# Patient Record
Sex: MALE | Race: BLACK OR AFRICAN AMERICAN
[De-identification: names, ages, dates, MRNs, and addresses within clinical notes are randomized per-mention and may not be internally consistent; named-entity substitution may affect disease eponyms.]

---

## 2018-08-23 ENCOUNTER — HOSPITAL ENCOUNTER (INPATIENT)
Dept: HOSPITAL 5 - ED | Age: 78
LOS: 5 days | Discharge: ANOTHER HOSPITAL | DRG: 287 | End: 2018-08-28
Attending: INTERNAL MEDICINE | Admitting: INTERNAL MEDICINE
Payer: MEDICARE

## 2018-08-23 DIAGNOSIS — I69.354: ICD-10-CM

## 2018-08-23 DIAGNOSIS — I25.10: Primary | ICD-10-CM

## 2018-08-23 DIAGNOSIS — K21.9: ICD-10-CM

## 2018-08-23 DIAGNOSIS — J45.909: ICD-10-CM

## 2018-08-23 DIAGNOSIS — Z88.5: ICD-10-CM

## 2018-08-23 DIAGNOSIS — E10.9: ICD-10-CM

## 2018-08-23 DIAGNOSIS — Z88.0: ICD-10-CM

## 2018-08-23 DIAGNOSIS — Z88.6: ICD-10-CM

## 2018-08-23 DIAGNOSIS — Z96.659: ICD-10-CM

## 2018-08-23 DIAGNOSIS — I10: ICD-10-CM

## 2018-08-23 DIAGNOSIS — E89.0: ICD-10-CM

## 2018-08-23 DIAGNOSIS — Z79.899: ICD-10-CM

## 2018-08-23 DIAGNOSIS — G47.00: ICD-10-CM

## 2018-08-23 LAB
BASOPHILS # (AUTO): 0.1 K/MM3 (ref 0–0.1)
BASOPHILS NFR BLD AUTO: 0.9 % (ref 0–1.8)
BUN SERPL-MCNC: 10 MG/DL (ref 9–20)
BUN/CREAT SERPL: 14 %
CALCIUM SERPL-MCNC: 9 MG/DL (ref 8.4–10.2)
EOSINOPHIL # BLD AUTO: 0.2 K/MM3 (ref 0–0.4)
EOSINOPHIL NFR BLD AUTO: 3 % (ref 0–4.3)
HCT VFR BLD CALC: 45.6 % (ref 35.5–45.6)
HEMOLYSIS INDEX: 17
HGB BLD-MCNC: 15.3 GM/DL (ref 11.8–15.2)
LYMPHOCYTES # BLD AUTO: 2.7 K/MM3 (ref 1.2–5.4)
LYMPHOCYTES NFR BLD AUTO: 39 % (ref 13.4–35)
MCH RBC QN AUTO: 29 PG (ref 28–32)
MCHC RBC AUTO-ENTMCNC: 34 % (ref 32–34)
MCV RBC AUTO: 86 FL (ref 84–94)
MONOCYTES # (AUTO): 0.6 K/MM3 (ref 0–0.8)
MONOCYTES % (AUTO): 8.7 % (ref 0–7.3)
PLATELET # BLD: 335 K/MM3 (ref 140–440)
RBC # BLD AUTO: 5.3 M/MM3 (ref 3.65–5.03)

## 2018-08-23 PROCEDURE — 85610 PROTHROMBIN TIME: CPT

## 2018-08-23 PROCEDURE — 78582 LUNG VENTILAT&PERFUS IMAGING: CPT

## 2018-08-23 PROCEDURE — 93005 ELECTROCARDIOGRAM TRACING: CPT

## 2018-08-23 PROCEDURE — 93458 L HRT ARTERY/VENTRICLE ANGIO: CPT

## 2018-08-23 PROCEDURE — 96374 THER/PROPH/DIAG INJ IV PUSH: CPT

## 2018-08-23 PROCEDURE — A9558 XE133 XENON 10MCI: HCPCS

## 2018-08-23 PROCEDURE — A9540 TC99M MAA: HCPCS

## 2018-08-23 PROCEDURE — 83880 ASSAY OF NATRIURETIC PEPTIDE: CPT

## 2018-08-23 PROCEDURE — 85379 FIBRIN DEGRADATION QUANT: CPT

## 2018-08-23 PROCEDURE — 82962 GLUCOSE BLOOD TEST: CPT

## 2018-08-23 PROCEDURE — 85025 COMPLETE CBC W/AUTO DIFF WBC: CPT

## 2018-08-23 PROCEDURE — A9502 TC99M TETROFOSMIN: HCPCS

## 2018-08-23 PROCEDURE — 83690 ASSAY OF LIPASE: CPT

## 2018-08-23 PROCEDURE — 96375 TX/PRO/DX INJ NEW DRUG ADDON: CPT

## 2018-08-23 PROCEDURE — 80048 BASIC METABOLIC PNL TOTAL CA: CPT

## 2018-08-23 PROCEDURE — 78452 HT MUSCLE IMAGE SPECT MULT: CPT

## 2018-08-23 PROCEDURE — 85730 THROMBOPLASTIN TIME PARTIAL: CPT

## 2018-08-23 PROCEDURE — 36415 COLL VENOUS BLD VENIPUNCTURE: CPT

## 2018-08-23 PROCEDURE — 93010 ELECTROCARDIOGRAM REPORT: CPT

## 2018-08-23 PROCEDURE — 93017 CV STRESS TEST TRACING ONLY: CPT

## 2018-08-23 PROCEDURE — 86900 BLOOD TYPING SEROLOGIC ABO: CPT

## 2018-08-23 PROCEDURE — 84484 ASSAY OF TROPONIN QUANT: CPT

## 2018-08-23 PROCEDURE — 82550 ASSAY OF CK (CPK): CPT

## 2018-08-23 PROCEDURE — 86901 BLOOD TYPING SEROLOGIC RH(D): CPT

## 2018-08-23 PROCEDURE — C1894 INTRO/SHEATH, NON-LASER: HCPCS

## 2018-08-23 PROCEDURE — 86850 RBC ANTIBODY SCREEN: CPT

## 2018-08-23 PROCEDURE — 71045 X-RAY EXAM CHEST 1 VIEW: CPT

## 2018-08-23 PROCEDURE — 82553 CREATINE MB FRACTION: CPT

## 2018-08-23 RX ADMIN — METOPROLOL TARTRATE SCH MG: 25 TABLET, FILM COATED ORAL at 22:26

## 2018-08-23 RX ADMIN — TEMAZEPAM PRN MG: 15 CAPSULE ORAL at 22:29

## 2018-08-23 NOTE — XRAY REPORT
AP CHEST:



HISTORY: chest pain



AP view of the chest demonstrates a normal mediastinal and

cardiac contour with clear lungs and normal bony and soft tissue

structures.



IMPRESSION:

Unremarkable AP chest. No change since 2/8/18.

## 2018-08-23 NOTE — EMERGENCY DEPARTMENT REPORT
ED Chest Pain HPI





- General


Chief Complaint: Chest Pain


Stated Complaint: CHEST PAIN


Time Seen by Provider: 08/23/18 09:22


Source: patient, EMS


Mode of arrival: Stretcher


Limitations: Physical Limitation





- History of Present Illness


Initial Comments: 





Patient is 77 years old male with history of hypertension, diabetes and 2 a 

stroke.  Patient presented to the ER complaining of left sided chest pain for 

the last 2 days.  Patient describes pain as pressure radiating to the left 

upper extremity.  Patient denied any shortness of breath, nausea or vomiting.  

No weakness numbness or tingling sensation.  Patient denied any history of 

coronary artery disease before.


MD Complaint: chest pain


-: days(s)


Onset: during rest


Pain Location: left chest


Pain Radiation: LUE


Severity scale (0 -10): 4


Quality: pressure


Consistency: intermittent





- Related Data


 Previous Rx's











 Medication  Instructions  Recorded  Last Taken  Type


 


Metoclopramide [Reglan TAB] 10 mg PO Q6H PRN  tablet 01/09/18 Unknown Rx


 


Pitavastatin Calcium [LiVALO] 2 mg PO DAILY #30 tablet 01/09/18 Unknown Rx


 


Acetaminophen [Acetaminophen TAB] 650 mg PO Q4H PRN  tablet 02/10/18 Unknown Rx


 


Bisacodyl [Dulcolax suppos] 10 mg IN QDAY PRN  supp.rect 02/10/18 Unknown Rx


 


Magnesium Hydroxide [Milk of 30 ml PO Q4H PRN  oral.liqd 02/10/18 Unknown Rx





Magnesia]    


 


Metoprolol [Lopressor TAB] 37.5 mg PO BID #60 tablet 02/10/18 Unknown Rx


 


Pantoprazole [Protonix TAB] 40 mg PO DAILY #30 tablet 02/10/18 Unknown Rx


 


Tamsulosin [Flomax] 0.4 mg PO QDAY #30 capsule 02/10/18 Unknown Rx


 


Aspirin EC [Aspirin Enteric Coated 81 mg PO QDAY #30 tablet.dr 04/23/18 Unknown 

Rx





TAB]    


 


Insulin NPH/Regular [NovoLIN 70/30] 10 unit SUB-Q BIDDIAB #7 units 04/23/18 

Unknown Rx


 


Levothyroxine [Synthroid] 75 mcg PO DAILY@0600 #30 tablet 04/23/18 Unknown Rx


 


Levothyroxine [Synthroid] 100 mcg PO DAILY@0600 #30 tablet 04/23/18 Unknown Rx


 


Temazepam [Restoril] 15 mg PO QHS PRN #30 capsule 04/23/18 Unknown Rx


 


levoFLOXacin [Levaquin TAB] 500 mg PO Q24HR #5 tablet 04/23/18 Unknown Rx


 


metroNIDAZOLE [Flagyl TAB] 500 mg PO Q8HR #15 tablet 04/23/18 Unknown Rx











 Allergies











Allergy/AdvReac Type Severity Reaction Status Date / Time


 


codeine Allergy  Itching Verified 01/03/18 14:14


 


penicillin Allergy  Rash Verified 01/03/18 14:14


 


aspirin AdvReac  Unknown Verified 01/03/18 14:14














Heart Score





- HEART Score


History: Moderately suspicious


EKG: Non-specific


Age: > 65


Risk factors: > 3 risk factors or hx of atherosclerotic disease


Troponin: < normal limit


HEART Score: 6





- Critical Actions


Critical Actions: 4-6 pts:12-16.6% risk of adverse cardiac event. Should be 

admitted





ED Review of Systems


ROS: 


Stated complaint: CHEST PAIN


Other details as noted in HPI





Comment: All other systems reviewed and negative


Constitutional: denies: chills, fever


Respiratory: denies: cough, orthopnea, shortness of breath, SOB with exertion, 

SOB at rest


Cardiovascular: chest pain.  denies: palpitations, dyspnea on exertion


Gastrointestinal: denies: abdominal pain, nausea, vomiting, diarrhea, 

constipation, hematemesis, melena, hematochezia


Neurological: denies: headache, weakness, numbness, paresthesias, confusion, 

abnormal gait





ED Past Medical Hx





- Past Medical History


Hx Hypertension: Yes


Hx CVA: Yes (L sided deficits)


Hx Diabetes: Yes


Hx Asthma: Yes


Hx HIV: No


Additional medical history: Graves dx.  gastric ulcers





- Surgical History


Additional Surgical History: knee OR bilat.  thyroid OR





- Social History


Smoking Status: Former Smoker


Substance Use Type: None





- Medications


Home Medications: 


 Home Medications











 Medication  Instructions  Recorded  Confirmed  Last Taken  Type


 


Metoclopramide [Reglan TAB] 10 mg PO Q6H PRN  tablet 01/09/18 04/21/18 Unknown 

Rx


 


Pitavastatin Calcium [LiVALO] 2 mg PO DAILY #30 tablet 01/09/18 04/21/18 

Unknown Rx


 


Acetaminophen [Acetaminophen TAB] 650 mg PO Q4H PRN  tablet 02/10/18 04/21/18 

Unknown Rx


 


Bisacodyl [Dulcolax suppos] 10 mg IN QDAY PRN  supp.rect 02/10/18 04/21/18 

Unknown Rx


 


Magnesium Hydroxide [Milk of 30 ml PO Q4H PRN  oral.liqd 02/10/18 04/21/18 

Unknown Rx





Magnesia]     


 


Metoprolol [Lopressor TAB] 37.5 mg PO BID #60 tablet 02/10/18 04/21/18 Unknown 

Rx


 


Pantoprazole [Protonix TAB] 40 mg PO DAILY #30 tablet 02/10/18 04/21/18 Unknown 

Rx


 


Tamsulosin [Flomax] 0.4 mg PO QDAY #30 capsule 02/10/18 04/21/18 Unknown Rx


 


Aspirin EC [Aspirin Enteric Coated 81 mg PO QDAY #30 tablet.dr 04/23/18  

Unknown Rx





TAB]     


 


Insulin NPH/Regular [NovoLIN 70/30] 10 unit SUB-Q BIDDIAB #7 units 04/23/18 04/ 21/18 Unknown Rx


 


Levothyroxine [Synthroid] 75 mcg PO DAILY@0600 #30 tablet 04/23/18  Unknown Rx


 


Levothyroxine [Synthroid] 100 mcg PO DAILY@0600 #30 tablet 04/23/18  Unknown Rx


 


Temazepam [Restoril] 15 mg PO QHS PRN #30 capsule 04/23/18  Unknown Rx


 


levoFLOXacin [Levaquin TAB] 500 mg PO Q24HR #5 tablet 04/23/18  Unknown Rx


 


metroNIDAZOLE [Flagyl TAB] 500 mg PO Q8HR #15 tablet 04/23/18  Unknown Rx














ED Physical Exam





- General


Limitations: No Limitations, Physical Limitation


General appearance: alert, in no apparent distress





- Head


Head exam: Present: atraumatic, normocephalic, normal inspection





- Eye


Eye exam: Present: normal appearance, PERRL





- ENT


ENT exam: Present: normal exam, normal orophraynx, mucous membranes moist





- Neck


Neck exam: Present: normal inspection, full ROM.  Absent: tenderness, 

meningismus, lymphadenopathy, thyromegaly





- Respiratory


Respiratory exam: Present: normal lung sounds bilaterally.  Absent: respiratory 

distress, wheezes, rales, rhonchi, stridor, chest wall tenderness, accessory 

muscle use, decreased breath sounds, prolonged expiratory





- Cardiovascular


Cardiovascular Exam: Present: regular rate, normal rhythm, normal heart sounds





- GI/Abdominal


GI/Abdominal exam: Present: soft, normal bowel sounds.  Absent: distended, 

tenderness, guarding, rebound, rigid, organomegaly, mass, bruit, pulsatile mass

, hernia





- Extremities Exam


Extremities exam: Present: normal inspection, full ROM, normal capillary 

refill.  Absent: tenderness, pedal edema, joint swelling, calf tenderness





- Back Exam


Back exam: Present: normal inspection, full ROM.  Absent: tenderness, CVA 

tenderness (R), CVA tenderness (L), muscle spasm, paraspinal tenderness, 

vertebral tenderness, rash noted





- Neurological Exam


Neurological exam: Present: alert, oriented X3, CN II-XII intact, normal gait, 

reflexes normal





- Skin


Skin exam: Present: warm, intact, normal color





ED Course


 Vital Signs











  08/23/18





  09:13


 


Temperature 98.7 F


 


Pulse Rate 65


 


Respiratory 14





Rate 


 


Blood Pressure 156/73


 


Blood Pressure 156/73





[Right] 


 


O2 Sat by Pulse 94





Oximetry 














- Reevaluation(s)


Reevaluation #1: 





08/23/18 11:17


* Patient family and from the nurse and that patient is having swelling.  

Patient examined by me there is no evidence of swelling and patient denied any 

difficulty breathing or swallowing.  Patient is on lisinopril for years advised 

to stop the lisinopril to give the patient Solu-Medrol 125 and Benadryl 25 mg 

and Pepcid 20 mg.  Patient will be admitted to telemetry





ED Medical Decision Making





- Lab Data


Result diagrams: 


 08/23/18 09:28





 08/23/18 09:28





- EKG Data


-: EKG Interpreted by Me


EKG shows normal: sinus rhythm


Rate: normal





- EKG Data


Interpretation: no acute changes





- Radiology Data


Radiology results: report reviewed





- Medical Decision Making





I discussed the patient is Dr. Valera, he agreed to admit the patient to 

medical service.


Critical care attestation.: 


If time is entered above; I have spent that time in minutes in the direct care 

of this critically ill patient, excluding procedure time.








ED Disposition


Clinical Impression: 


 Chest pain





Disposition: DC-09 OP ADMIT IP TO THIS HOSP


Is pt being admited?: Yes


Condition: Stable


Instructions:  Chest Pain (ED)


Referrals: 


PRIMARY CARE,MD [Primary Care Provider] - 3-5 Days

## 2018-08-23 NOTE — HISTORY AND PHYSICAL REPORT
History of Present Illness


Date of examination: 08/23/18


Date of admission: 





08/23/18


Chief complaint: 





Chest pain





History of present illness: 





Patient is 77 years old male with history of hypertension, diabetes and 2 a 

stroke.  Patient presented to the ER complaining of left sided chest pain for 

the last 2 days.  Patient describes pain as pressure radiating to the left 

upper extremity.  Patient denied any shortness of breath, nausea or vomiting.  

No weakness numbness or tingling sensation.  Patient denied any history of 

coronary artery disease before.





Past History


Past Medical History: diabetes, GERD, hypertension


Past Surgical History: thyroidectomy, total knee replacement


Social history: single


Family history: no significant family history (reviewed)











Medications and Allergies


 Allergies











Allergy/AdvReac Type Severity Reaction Status Date / Time


 


codeine Allergy  Itching Verified 01/03/18 14:14


 


penicillin Allergy  Rash Verified 01/03/18 14:14


 


aspirin AdvReac  Unknown Verified 01/03/18 14:14











 Home Medications











 Medication  Instructions  Recorded  Confirmed  Last Taken  Type


 


Metoprolol [Lopressor TAB] 37.5 mg PO BID #60 tablet 02/10/18 08/23/18 08/23/18 

Rx


 


Ezetimibe [Zetia] 10 mg PO QDAY 08/23/18 08/23/18 Unknown History


 


Levothyroxine Sodium [Synthroid] 175 mcg PO QDAY 08/23/18 08/23/18 Unknown 

History


 


Lisinopril [Zestril] 40 mg PO QDAY 08/23/18 08/23/18 08/23/18 History














Exam





- Physical Exam


Narrative exam: 





Hospitalist Physical exam:


GENERAL:  well-developed and well-nourished male  lying on bed appeared to be 

in no discomfort. 


HEENT: Normocephalic.  Atraumatic.  No conjunctival congestion or icterus. 

Patient has moist mucous membranes.


NECK: Supple.  Trachea midline.


CHEST/LUNGS: Clear to auscultated bilaterally, breathing nonlabored. No wheezes 

crackles or rhonchi.


HEART/CARDIOVASCULAR: Regular in rate and rhythm.  S1 and S2 positive.


ABDOMEN: Abdomen is soft, LLQ tender.  Patient has normal bowel sounds.  


SKIN: There is no rash.  Warm and dry.


NEURO:  leftsided weakness.  Follows command.


MUSCULOSKELETAL: No joint effusion or tenderness.


EXTRIMITY: No edema, no cyanosis or clubbing.


PSYCH:  Cooperative.











- Constitutional


Vitals: 


 











Temp Pulse Resp BP Pulse Ox


 


 98.7 F   65   14   156/73   96 


 


 08/23/18 09:13  08/23/18 09:13  08/23/18 09:13  08/23/18 09:13  08/23/18 09:13














Results





- Labs


CBC & Chem 7: 


 08/23/18 09:28





 08/23/18 09:28


Labs: 


 Abnormal lab results











  08/23/18 08/23/18 08/23/18 Range/Units





  09:28 09:28 09:28 


 


RBC  5.30 H    (3.65-5.03)  M/mm3


 


Hgb  15.3 H    (11.8-15.2)  gm/dl


 


Lymph % (Auto)  39.0 H    (13.4-35.0)  %


 


Mono % (Auto)  8.7 H    (0.0-7.3)  %


 


Sodium   136 L   (137-145)  mmol/L


 


Chloride   97.9 L   ()  mmol/L


 


Creatinine   0.7 L   (0.8-1.5)  mg/dL


 


Glucose   133 H   ()  mg/dL


 


Lipase    85 H  (13-60)  units/L














Assessment and Plan





/Chest pain, unstable angina?


- admit tele, monitor serial CE, EKG


- 2d echo on 02/18 showed preserved EF


- plan for stress test tomorrow


- cont asp, statin and BB





/Hypertension, stable


Monitored BP q shift, ordered IV hydralazine prn for SBP >160


resumed home meds





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


- Cont aspirin and statin





/Insomnia, on restoril





/hypothyroidism, on synthroid





/ DVT prophylaxis


lovenox





Radiological test: 


CXR - no infiltrates

## 2018-08-24 RX ADMIN — METOPROLOL TARTRATE SCH MG: 25 TABLET, FILM COATED ORAL at 09:13

## 2018-08-24 RX ADMIN — TAMSULOSIN HYDROCHLORIDE SCH MG: 0.4 CAPSULE ORAL at 09:14

## 2018-08-24 RX ADMIN — METOPROLOL TARTRATE SCH MG: 25 TABLET, FILM COATED ORAL at 22:58

## 2018-08-24 RX ADMIN — TEMAZEPAM PRN MG: 15 CAPSULE ORAL at 23:47

## 2018-08-24 RX ADMIN — PRAVASTATIN SODIUM SCH MG: 40 TABLET ORAL at 22:59

## 2018-08-24 RX ADMIN — ASPIRIN SCH MG: 81 TABLET, COATED ORAL at 09:14

## 2018-08-24 RX ADMIN — PANTOPRAZOLE SODIUM SCH MG: 40 TABLET, DELAYED RELEASE ORAL at 09:14

## 2018-08-24 RX ADMIN — OXYCODONE AND ACETAMINOPHEN PRN TAB: 5; 325 TABLET ORAL at 15:16

## 2018-08-24 RX ADMIN — LEVOTHYROXINE SODIUM SCH: 0.1 TABLET ORAL at 05:48

## 2018-08-24 NOTE — PROGRESS NOTE
Assessment and Plan





/Chest pain, unstable angina?


- monitor at tele, monitor serial CE, EKG


- 2d echo on 02/18 showed preserved EF


- VQ scan -ve for acute PE


- plan for stress test on Sunday


- cont asp, statin and BB





/Hypertension, stable


Monitored BP q shift, ordered IV hydralazine prn for SBP >160


resumed home meds





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


- Cont aspirin and statin





/Insomnia, on restoril





/hypothyroidism, on synthroid





/ DVT prophylaxis


lovenox





Radiological test: 


CXR - no infiltrates





VQ scan: Limited ventilation imaging with normal perfusion images. No pulmonary 

embolus suspected. 











Subjective


Date of service: 08/24/18


Interval history: 





Pt seen and examined


C/o intermittent chest pain





Objective





- Exam


Narrative Exam: 





Hospitalist Physical exam:


GENERAL:  well-developed and well-nourished male  lying on bed appeared to be 

in no discomfort. 


HEENT: Normocephalic.  Atraumatic.  No conjunctival congestion or icterus. 

Patient has moist mucous membranes.


NECK: Supple.  Trachea midline.


CHEST/LUNGS: Clear to auscultated bilaterally, breathing nonlabored. No wheezes 

crackles or rhonchi.


HEART/CARDIOVASCULAR: Regular in rate and rhythm.  S1 and S2 positive.


ABDOMEN: Abdomen is soft, LLQ tender.  Patient has normal bowel sounds.  


SKIN: There is no rash.  Warm and dry.


NEURO:  leftsided weakness.  Follows command.


MUSCULOSKELETAL: No joint effusion or tenderness.


EXTRIMITY: No edema, no cyanosis or clubbing.


PSYCH:  Cooperative.











- Constitutional


Vitals: 


 Vital Signs - 12hr











  08/24/18 08/24/18 08/24/18





  05:35 08:13 09:13


 


Temperature 97.5 F L  


 


Pulse Rate 72 80 80


 


Pulse Rate [   





From Monitor]   


 


Respiratory 20  





Rate   


 


Respiratory   





Rate [Hip]   


 


Blood Pressure   140/74


 


Blood Pressure 143/78  





[Right]   


 


O2 Sat by Pulse 96  





Oximetry   














  08/24/18





  10:00


 


Temperature 


 


Pulse Rate 


 


Pulse Rate [ 90





From Monitor] 


 


Respiratory 20





Rate 


 


Respiratory 20





Rate [Hip] 


 


Blood Pressure 


 


Blood Pressure 





[Right] 


 


O2 Sat by Pulse 





Oximetry 














- Labs


CBC & Chem 7: 


 08/23/18 09:28





 08/23/18 09:28


Labs: 


 Abnormal lab results











  08/23/18 08/23/18 08/24/18 Range/Units





  20:58 20:58 12:11 


 


D-Dimer  379.9 H    (0-234)  ng/mlDDU


 


POC Glucose    167 H  ()  


 


Total Creatine Kinase   36 L   ()  units/L

## 2018-08-24 NOTE — NUCLEAR MEDICINE REPORT
Ventilation/perfusion lung scan:



Elevated d-dimer/chest pain.



The ventilation imaging is somewhat limited. There is a relatively 

unremarkable distribution of radionuclide on the initial images. 

Equilibrium images however are inadequate and there is no obvious 

retention on the delayed images.



The perfusion images with multiple projections demonstrates a 

homogeneous and unremarkable distribution of radioactivity in both 

lungs.



Impressions:



Limited ventilation imaging with normal perfusion images. No pulmonary 

embolus suspected.

## 2018-08-25 RX ADMIN — HEPARIN SODIUM SCH UNIT: 5000 INJECTION, SOLUTION INTRAVENOUS; SUBCUTANEOUS at 21:37

## 2018-08-25 RX ADMIN — LEVOTHYROXINE SODIUM SCH MCG: 0.1 TABLET ORAL at 06:55

## 2018-08-25 RX ADMIN — PRAVASTATIN SODIUM SCH MG: 40 TABLET ORAL at 21:34

## 2018-08-25 RX ADMIN — METOPROLOL TARTRATE SCH MG: 25 TABLET, FILM COATED ORAL at 10:04

## 2018-08-25 RX ADMIN — METOPROLOL TARTRATE SCH MG: 25 TABLET, FILM COATED ORAL at 21:35

## 2018-08-25 RX ADMIN — TEMAZEPAM PRN MG: 15 CAPSULE ORAL at 21:34

## 2018-08-25 RX ADMIN — TAMSULOSIN HYDROCHLORIDE SCH MG: 0.4 CAPSULE ORAL at 10:03

## 2018-08-25 RX ADMIN — ASPIRIN SCH MG: 81 TABLET, COATED ORAL at 10:03

## 2018-08-25 RX ADMIN — PANTOPRAZOLE SODIUM SCH MG: 40 TABLET, DELAYED RELEASE ORAL at 10:02

## 2018-08-25 NOTE — PROGRESS NOTE
Assessment and Plan


Assessment and plan: 





77-year-old male with past medical history significant for hypertension, 

hypothyroidism, diabetes mellitus presented to the emergency department with 

complaints of chest pain.  In the ED VQ scan was done and no probability for PE.





Chest pain


- Cardiac enzymes were negative


- Couldn't do this today because of the VQ scan, will have stress test tomorrow


Diabetes mellitus


- Continue insulin


Hypertension


- Continue antihypertensives


Hypothyroidism


- Continue levothyroxine


DVT prophylaxis


- On heparin


Disposition


- Possibly tomorrow if stress test is negative.





History


Interval history: 





Patient was seen and evaluated this morning, patient said he didn't have any 

chest pain today.





Hospitalist Physical





- Physical exam


Narrative exam: 





 Not in cardiopulmonary distress. 


 The patient appeared well nourished and normally developed.


 Vital signs as documented.


 Head exam is unremarkable.


 No scleral icterus .


 Neck is without jugular venous distension, thyromegaly, or carotid bruits. 


 Lungs are clear to auscultation.


Cardiac exam reveals regular rate and  Rhythm. First and second heart sounds 

normal. No murmurs, rubs or gallops. 


Abdominal exam reveals normal bowel sounds, no masses, no organomegaly and no 

aortic enlargement. 


Extremities are nonedematous and both femoral and pedal pulses are normal.


CNS: Alert and oriented 3.  No focal weakness.





- Constitutional


Vitals: 


 











Temp Pulse Resp BP Pulse Ox


 


 98.1 F   78   20   164/80   96 


 


 08/25/18 05:01  08/25/18 10:04  08/25/18 10:00  08/25/18 10:04  08/25/18 10:00














Results





- Labs


CBC & Chem 7: 


 08/23/18 09:28





 08/23/18 09:28


Labs: 


 Laboratory Last Values











WBC  7.0 K/mm3 (4.5-11.0)   08/23/18  09:28    


 


RBC  5.30 M/mm3 (3.65-5.03)  H  08/23/18  09:28    


 


Hgb  15.3 gm/dl (11.8-15.2)  H  08/23/18  09:28    


 


Hct  45.6 % (35.5-45.6)   08/23/18  09:28    


 


MCV  86 fl (84-94)   08/23/18  09:28    


 


MCH  29 pg (28-32)   08/23/18  09:28    


 


MCHC  34 % (32-34)   08/23/18  09:28    


 


RDW  14.2 % (13.2-15.2)   08/23/18  09:28    


 


Plt Count  335 K/mm3 (140-440)   08/23/18  09:28    


 


Lymph % (Auto)  39.0 % (13.4-35.0)  H  08/23/18  09:28    


 


Mono % (Auto)  8.7 % (0.0-7.3)  H  08/23/18  09:28    


 


Eos % (Auto)  3.0 % (0.0-4.3)   08/23/18  09:28    


 


Baso % (Auto)  0.9 % (0.0-1.8)   08/23/18  09:28    


 


Lymph #  2.7 K/mm3 (1.2-5.4)   08/23/18  09:28    


 


Mono #  0.6 K/mm3 (0.0-0.8)   08/23/18  09:28    


 


Eos #  0.2 K/mm3 (0.0-0.4)   08/23/18  09:28    


 


Baso #  0.1 K/mm3 (0.0-0.1)   08/23/18  09:28    


 


Seg Neutrophils %  48.4 % (40.0-70.0)   08/23/18  09:28    


 


Seg Neutrophils #  3.4 K/mm3 (1.8-7.7)   08/23/18  09:28    


 


D-Dimer  379.9 ng/mlDDU (0-234)  H  08/23/18  20:58    


 


Sodium  136 mmol/L (137-145)  L  08/23/18  09:28    


 


Potassium  4.1 mmol/L (3.6-5.0)   08/23/18  09:28    


 


Chloride  97.9 mmol/L ()  L  08/23/18  09:28    


 


Carbon Dioxide  26 mmol/L (22-30)   08/23/18  09:28    


 


Anion Gap  16 mmol/L  08/23/18  09:28    


 


BUN  10 mg/dL (9-20)   08/23/18  09:28    


 


Creatinine  0.7 mg/dL (0.8-1.5)  L  08/23/18  09:28    


 


Estimated GFR  > 60 ml/min  08/23/18  09:28    


 


BUN/Creatinine Ratio  14 %  08/23/18  09:28    


 


Glucose  133 mg/dL ()  H  08/23/18  09:28    


 


POC Glucose  211  ()  H  08/25/18  11:23    


 


Calcium  9.0 mg/dL (8.4-10.2)   08/23/18  09:28    


 


Total Creatine Kinase  36 units/L ()  L  08/23/18  20:58    


 


CK-MB (CK-2)  < 1.0 ng/mL (0.0-4.0)   08/23/18  20:58    


 


CK-MB (CK-2) Rel Index  2.7  (0-4)   08/23/18  20:58    


 


Troponin T  < 0.010 ng/mL (0.00-0.029)   08/23/18  20:58    


 


NT-Pro-B Natriuret Pep  89.52 pg/mL (0-900)   08/23/18  09:28    


 


Lipase  21 units/L (13-60)   08/24/18  05:45

## 2018-08-26 RX ADMIN — HEPARIN SODIUM SCH UNIT: 5000 INJECTION, SOLUTION INTRAVENOUS; SUBCUTANEOUS at 12:12

## 2018-08-26 RX ADMIN — PRAVASTATIN SODIUM SCH MG: 40 TABLET ORAL at 22:48

## 2018-08-26 RX ADMIN — PANTOPRAZOLE SODIUM SCH MG: 40 TABLET, DELAYED RELEASE ORAL at 12:11

## 2018-08-26 RX ADMIN — OXYCODONE AND ACETAMINOPHEN PRN TAB: 5; 325 TABLET ORAL at 18:27

## 2018-08-26 RX ADMIN — METOPROLOL TARTRATE SCH MG: 25 TABLET, FILM COATED ORAL at 22:48

## 2018-08-26 RX ADMIN — METOPROLOL TARTRATE SCH MG: 25 TABLET, FILM COATED ORAL at 12:11

## 2018-08-26 RX ADMIN — ASPIRIN SCH MG: 81 TABLET, COATED ORAL at 12:12

## 2018-08-26 RX ADMIN — LEVOTHYROXINE SODIUM SCH MCG: 0.1 TABLET ORAL at 06:28

## 2018-08-26 RX ADMIN — HEPARIN SODIUM SCH UNIT: 5000 INJECTION, SOLUTION INTRAVENOUS; SUBCUTANEOUS at 22:48

## 2018-08-26 RX ADMIN — TEMAZEPAM PRN MG: 15 CAPSULE ORAL at 22:48

## 2018-08-26 RX ADMIN — TAMSULOSIN HYDROCHLORIDE SCH MG: 0.4 CAPSULE ORAL at 12:11

## 2018-08-26 NOTE — EVENT NOTE
Date: 08/26/18


Cardiac consult dictated.


TID on MPI.


Schedule for cardiac catheterization.


Yudelka Mosher.

## 2018-08-26 NOTE — CONSULTATION
REQUESTING PHYSICIAN:  Nahun Fermin M.D.



REASON FOR CONSULTATION:  Abnormal stress test.



HISTORY OF PRESENT ILLNESS:  This 77-year-old patient has a history of diabetes

mellitus since 1979 along with hypertension and hyperlipidemia.  About 6 years

ago, he was told to have a coronary artery angiogram at [facility],

but this was not done because of insurance conflicts.  The patient has a history

of GI bleeding about 8 years ago, admitted to [facility], getting

about 5 units of packed red blood cells, an endoscope and proton pump

inhibitors.  Since then, he has been told not to take aspirin.



The patient was admitted in January with a left-sided stroke and again

recurrence of the stroke in February.  He was discharged home on statins, beta

blockers, and Plavix, and not aspirin to the rehab center.  He was there for

about a month and he was discharged home without Plavix or aspirin.  The patient

came in with a precordial pressure type of pain without any radiation to the

left side, but radiation to the right shoulder.  It lasted half an hour each

time.  This has been going on for last 1 week.  When he came in, his D-dimer was

elevated and therefore he had a CT of the chest on Saturday and that was on

08/25/2018, and this was negative.  Therefore, he was scheduled to have

myocardial perfusion imaging studies today, and that is on 08/26/2018.  This

revealed transient ischemic dilatation of 1.27 without any perfusion

abnormalities.  Because of risk factors and chest pain along with an abnormal

stress test, the patient was given a choice of coronary arteriogram to find out

any disease he has that needs treatment.



SOCIAL HISTORY:  The patient smoked 2-1/2 packs of cigarettes per day until 10

years ago.  He is not .  He has no children.



MEDICATIONS:  At home include Reglan 10 mg p.r.n., pitavastatin 2 mg once a day,

metoprolol 37.5 mg twice a day, Protonix 40 mg once a day, Flomax 0.4 mg once a

day, insulin, Synthroid 75 mcg.  He had a subtotal thyroidectomy 20 years ago

for hyperthyroidism.  He takes another tablet of Synthroid 100 mcg, Restoril 50

mg at bedtime.



ALLERGIES:  CODEINE, PENICILLIN.  ASPIRIN is listed as an allergy, but it is due

to gastric ulcers and he was told not to take it.



REVIEW OF SYSTEMS:  As per the history of present illness.



PHYSICAL EXAMINATION:

VITAL SIGNS:  BMI is 24.4, blood pressure 150/60, pulse 70 per minute. 

NECK:  No bruits noted in the neck.  No JVD elevation.

HEART:  PMI is not palpable.  Heart sounds heard well.  S4 is noted.  No

murmurs.

LUNGS:  Clear.

ABDOMEN:  Soft.  Liver is not palpable.  Bowel sounds are active.

EXTREMITIES:  No edema.  Good pulses.  He has evidence of left hemiparesis.



DICTATION ENDS HERE.





DD: 08/26/2018 13:47

DT: 08/26/2018 21:36

JOB# [id number]  [id number]

KR/NTS

## 2018-08-26 NOTE — PROGRESS NOTE
Assessment and Plan


Assessment and plan: 





77-year-old male with past medical history significant for hypertension, 

hypothyroidism, diabetes mellitus presented to the emergency department with 

complaints of chest pain.  In the ED VQ scan was done and no probability for PE.





Chest pain


- Cardiac enzymes were negative


- Couldn't do this today because of the VQ scan


- Stress test was done and showed transient ischemic dilation, given his chest 

pressure and stress test patient will have cardiac cath.


- Cardiology consulted


Diabetes mellitus


- Continue insulin


Hypertension


- Continue antihypertensives


Hypothyroidism


- Continue levothyroxine


DVT prophylaxis


- On heparin


Disposition


- Possibly tomorrow if stress test is negative.





History


Interval history: 





Patient was seen and evaluated this morning, patient said he didn't have any 

chest pain today but said he has some chest pressure.





Hospitalist Physical





- Physical exam


Narrative exam: 





 Not in cardiopulmonary distress. 


 The patient appeared well nourished and normally developed.


 Vital signs as documented.


 Head exam is unremarkable.


 No scleral icterus .


 Neck is without jugular venous distension, thyromegaly, or carotid bruits. 


 Lungs are clear to auscultation.


Cardiac exam reveals regular rate and  Rhythm. First and second heart sounds 

normal. No murmurs, rubs or gallops. 


Abdominal exam reveals normal bowel sounds, no masses, no organomegaly and no 

aortic enlargement. 


Extremities are nonedematous and both femoral and pedal pulses are normal.


CNS: Alert and oriented 3.  No focal weakness.





- Constitutional


Vitals: 


 











Temp Pulse Resp BP Pulse Ox


 


 97.6 F   78   18   165/87   94 


 


 08/26/18 05:51  08/26/18 09:43  08/26/18 05:51  08/26/18 09:43  08/26/18 05:51














Results





- Labs


CBC & Chem 7: 


 08/23/18 09:28





 08/23/18 09:28


Labs: 


 Laboratory Last Values











WBC  7.0 K/mm3 (4.5-11.0)   08/23/18  09:28    


 


RBC  5.30 M/mm3 (3.65-5.03)  H  08/23/18  09:28    


 


Hgb  15.3 gm/dl (11.8-15.2)  H  08/23/18  09:28    


 


Hct  45.6 % (35.5-45.6)   08/23/18  09:28    


 


MCV  86 fl (84-94)   08/23/18  09:28    


 


MCH  29 pg (28-32)   08/23/18  09:28    


 


MCHC  34 % (32-34)   08/23/18  09:28    


 


RDW  14.2 % (13.2-15.2)   08/23/18  09:28    


 


Plt Count  335 K/mm3 (140-440)   08/23/18  09:28    


 


Lymph % (Auto)  39.0 % (13.4-35.0)  H  08/23/18  09:28    


 


Mono % (Auto)  8.7 % (0.0-7.3)  H  08/23/18  09:28    


 


Eos % (Auto)  3.0 % (0.0-4.3)   08/23/18  09:28    


 


Baso % (Auto)  0.9 % (0.0-1.8)   08/23/18  09:28    


 


Lymph #  2.7 K/mm3 (1.2-5.4)   08/23/18  09:28    


 


Mono #  0.6 K/mm3 (0.0-0.8)   08/23/18  09:28    


 


Eos #  0.2 K/mm3 (0.0-0.4)   08/23/18  09:28    


 


Baso #  0.1 K/mm3 (0.0-0.1)   08/23/18  09:28    


 


Seg Neutrophils %  48.4 % (40.0-70.0)   08/23/18  09:28    


 


Seg Neutrophils #  3.4 K/mm3 (1.8-7.7)   08/23/18  09:28    


 


D-Dimer  379.9 ng/mlDDU (0-234)  H  08/23/18  20:58    


 


Sodium  136 mmol/L (137-145)  L  08/23/18  09:28    


 


Potassium  4.1 mmol/L (3.6-5.0)   08/23/18  09:28    


 


Chloride  97.9 mmol/L ()  L  08/23/18  09:28    


 


Carbon Dioxide  26 mmol/L (22-30)   08/23/18  09:28    


 


Anion Gap  16 mmol/L  08/23/18  09:28    


 


BUN  10 mg/dL (9-20)   08/23/18  09:28    


 


Creatinine  0.7 mg/dL (0.8-1.5)  L  08/23/18  09:28    


 


Estimated GFR  > 60 ml/min  08/23/18  09:28    


 


BUN/Creatinine Ratio  14 %  08/23/18  09:28    


 


Glucose  133 mg/dL ()  H  08/23/18  09:28    


 


POC Glucose  234  ()  H  08/26/18  11:40    


 


Calcium  9.0 mg/dL (8.4-10.2)   08/23/18  09:28    


 


Total Creatine Kinase  36 units/L ()  L  08/23/18  20:58    


 


CK-MB (CK-2)  < 1.0 ng/mL (0.0-4.0)   08/23/18  20:58    


 


CK-MB (CK-2) Rel Index  2.7  (0-4)   08/23/18  20:58    


 


Troponin T  < 0.010 ng/mL (0.00-0.029)   08/23/18  20:58    


 


NT-Pro-B Natriuret Pep  89.52 pg/mL (0-900)   08/23/18  09:28    


 


Lipase  21 units/L (13-60)   08/24/18  05:45

## 2018-08-26 NOTE — TREADMILL REPORT
MYOCARDIAL PERFUSION IMAGING STUDY



Resting images revealed homogeneous radioisotope activity throughout myocardium.

 There was slightly increased gut uptake noted.  On post-Lexiscan images, again

there was homogeneous radioisotope activity noted throughout the myocardium.



There was a transient ischemic dilatation of 1.27.  Ejection fraction was normal

at 65% on gated stress scan.



IMPRESSION:  This test is negative for ischemia.  There is no segmental motion

abnormalities noted.  Preserved ejection fraction.  However, there was transient

ischemic dilatation.  The significance of which is not well known.  It may be

seen in triple vessel disease or could be normal variant.  Clinical correlation

and recommendation is suggested.





DD: 08/26/2018 11:11

DT: 08/26/2018 13:21

JOB# [id number]  [id number]

ANASTASIA/RUDOLPH

## 2018-08-27 LAB
APTT BLD: 28.8 SEC. (ref 24.2–36.6)
BUN SERPL-MCNC: 14 MG/DL (ref 9–20)
BUN/CREAT SERPL: 18 %
CALCIUM SERPL-MCNC: 9.3 MG/DL (ref 8.4–10.2)
HEMOLYSIS INDEX: 30
INR PPP: 0.94 (ref 0.87–1.13)

## 2018-08-27 PROCEDURE — 4A023N7 MEASUREMENT OF CARDIAC SAMPLING AND PRESSURE, LEFT HEART, PERCUTANEOUS APPROACH: ICD-10-PCS | Performed by: INTERNAL MEDICINE

## 2018-08-27 PROCEDURE — B211YZZ FLUOROSCOPY OF MULTIPLE CORONARY ARTERIES USING OTHER CONTRAST: ICD-10-PCS | Performed by: INTERNAL MEDICINE

## 2018-08-27 PROCEDURE — B215YZZ FLUOROSCOPY OF LEFT HEART USING OTHER CONTRAST: ICD-10-PCS | Performed by: INTERNAL MEDICINE

## 2018-08-27 RX ADMIN — POTASSIUM CHLORIDE SCH: 10 INJECTION, SOLUTION INTRAVENOUS at 11:59

## 2018-08-27 RX ADMIN — PANTOPRAZOLE SODIUM SCH MG: 40 TABLET, DELAYED RELEASE ORAL at 12:02

## 2018-08-27 RX ADMIN — POTASSIUM CHLORIDE SCH MLS/HR: 10 INJECTION, SOLUTION INTRAVENOUS at 09:20

## 2018-08-27 RX ADMIN — TAMSULOSIN HYDROCHLORIDE SCH MG: 0.4 CAPSULE ORAL at 11:59

## 2018-08-27 RX ADMIN — ASPIRIN SCH MG: 81 TABLET, COATED ORAL at 08:46

## 2018-08-27 RX ADMIN — ASPIRIN SCH: 81 TABLET, COATED ORAL at 12:00

## 2018-08-27 RX ADMIN — POTASSIUM CHLORIDE SCH: 10 INJECTION, SOLUTION INTRAVENOUS at 10:59

## 2018-08-27 RX ADMIN — PRAVASTATIN SODIUM SCH MG: 40 TABLET ORAL at 22:29

## 2018-08-27 RX ADMIN — HEPARIN SODIUM SCH UNIT: 5000 INJECTION, SOLUTION INTRAVENOUS; SUBCUTANEOUS at 22:29

## 2018-08-27 RX ADMIN — LEVOTHYROXINE SODIUM SCH: 0.1 TABLET ORAL at 05:21

## 2018-08-27 RX ADMIN — METOPROLOL TARTRATE SCH MG: 25 TABLET, FILM COATED ORAL at 22:29

## 2018-08-27 RX ADMIN — POTASSIUM CHLORIDE SCH: 10 INJECTION, SOLUTION INTRAVENOUS at 13:00

## 2018-08-27 RX ADMIN — HEPARIN SODIUM SCH: 5000 INJECTION, SOLUTION INTRAVENOUS; SUBCUTANEOUS at 12:01

## 2018-08-27 RX ADMIN — METOPROLOL TARTRATE SCH MG: 25 TABLET, FILM COATED ORAL at 12:01

## 2018-08-27 NOTE — DISCHARGE SUMMARY
Providers





- Providers


Date of Admission: 


08/23/18 10:37





Attending physician: 


YANA SONI MD





 





08/26/18 11:25


Consult to Physician [CONS] Routine 


   Comment: 


   Consulting Provider: BERHANE MENDEZ


   Physician Instructions: 


   Reason For Exam: chest pain





08/26/18 13:25


Consult to Cardiac Rehabilitation [CONS] Routine 


   Reason For Exam: Cardiac Rehab Evaluation











Primary care physician: 


PRIMARY CARE MD








Hospitalization


Reason for admission: Chest pain, CAD need CABG


Condition: Stable


Pertinent studies: 





LHC which showed calcified left main and LAD with severe ostial LAD lesion 


Hospital course: 





77-year-old male with past medical history significant for hypertension, 

hypothyroidism, diabetes mellitus presented to the emergency department with 

complaints of chest pain.  In the ED VQ scan was done and low probability for 

PE.





Chest pain


- Cardiac enzymes were negative


- Stress test was done and showed transient ischemic dilation, given his 

continued chest pressure and indeterminate stress test 


 [facility] thiswas done which showed calcified left main and LAD with severe ostial 

LAD lesion. Pt  transferred to [facility] for further evaluation and management. 


Diabetes mellitus; treated with insulin


Hypertension; continued his home medications


Hypothyroidism, Continued levothyroxine


Patient was transferred to [facility], have discussed the finding and management 

plan with the patient and son who are in agreement with the plan.  Cardiology  

arranged the transfer  and patient transferred to [facility].


[facility]


Time spent for discharge: 34 minutes





- Discharge Diagnoses


(1) CAD (coronary artery disease)


Status: Acute   





(2) Chest pain


Status: Acute   





(3) Accelerated hypertension


Status: Acute   





(4) History of CVA with residual deficit


Status: Acute   





Core Measure Documentation





- Palliative Care


Palliative Care/ Comfort Measures: Not Applicable





- Core Measures


Any of the following diagnoses?: none (Patient has acute MI but patient 

transferred to another facility.), history only (stroke)





Exam





- Physical Exam


Narrative exam: 





 Not in cardiopulmonary distress. 


 The patient appeared well nourished and normally developed.


 Vital signs as documented.


 Head exam is unremarkable.


 No scleral icterus .


 Neck is without jugular venous distension, thyromegaly, or carotid bruits. 


 Lungs are clear to auscultation.


Cardiac exam reveals regular rate and  Rhythm. First and second heart sounds 

normal. No murmurs, rubs or gallops. 


Abdominal exam reveals normal bowel sounds, no masses, no organomegaly and no 

aortic enlargement. 


Extremities are nonedematous and both femoral and pedal pulses are normal.


CNS: Alert and oriented 3.  No focal weakness.





- Constitutional


Vitals: 


 











Temp Pulse Resp BP Pulse Ox


 


 97.9 F   66   18   137/71   93 


 


 08/27/18 04:58  08/27/18 04:58  08/27/18 04:58  08/27/18 04:58  08/27/18 04:58














Plan


Activity: no restrictions


Weight Bearing Status: Full Weight Bearing


Diet: low cholesterol, low salt, diabetic


Follow up with: 


PRIMARY CARE,MD [Primary Care Provider] - 7 Days

## 2018-08-27 NOTE — CARDIAC CATHERIZATION REPORT
INDICATION FOR PROCEDURE:  A 77-year-old gentleman with history of diabetes

mellitus, admitted with chest pain and Lexiscan nuclear imaging showed transient

ischemic dilation with no perfusion abnormalities.  Because of this the patient

is scheduled for cardiac catheterization and definitive diagnosis.  The patient

is aware of the procedure, potential complications and alternatives of therapy

available.



DESCRIPTION OF PROCEDURE:  The patient was brought to the catheterization

laboratory in a fasting condition.  Right wrist area and forearm thoroughly

cleansed with Betadine solution.  The patient was evaluated for moderate

sedation and when he was felt appropriate he received IV Versed and fentanyl. 

Subsequently, using multipurpose catheter left ventriculogram was performed in

HARRISON projection using hand injection.  Subsequently, using the multipurpose

catheter, left coronary angiograms were obtained in multiple views. 

Subsequently, JR4 catheter was used to obtain angiograms of the right coronary

artery.  At the end of the procedure, catheter and sheath were removed.  It is

to be noted the patient was monitored for moderate sedation throughout the

procedure with EKG, hemodynamic monitoring and pulse oximetry.  The patient's

monitoring stopped at 9:41 a.m.  The patient tolerated the procedure well.  No

untoward complications were noted.  Hemostasis was achieved with radial band. 

Following findings were noted.



HEMODYNAMICS:

1. Opening aortic pressure 148/66, left ventricular pressure 139/10.  No

gradient across the aortic valve.  Estimated ejection fraction 65%.

2. Left ventriculogram done in HARRISON projection showed normal size left ventricle

with normal contractility.  End-diastolic and systolic volumes are normal. 

Mitral regurgitation could not be evaluated.

3. Right coronary artery is a nondominant artery arises normally from right

coronary cusp, angiographically smooth and normal.

4. Left coronary artery arises normally from left coronary cusp.  There is

moderate to severe calcification noted in the left main, LAD area.  Left main is

very short, immediately dividing the LAD and circumflex branches.  LAD has a

very tight more than 95% lesion at the ostium.  Distal LAD is filling fairly

well; however, filling is not complete.  Mid diagonal has a 70% smooth lesion. 

Circumflex artery dominant vessel shows 50-60% smooth lesion in the very distal

OM branch.  Otherwise, no significant disease.  Collaterals none.



FINAL IMPRESSION:

1.Normal sized left ventricle with normal contractility.

2.Calcified left main and LAD with severe ostial LAD lesion with moderate 70%

mid diagonal lesion and distal OM has 50% smooth lesion.



At this time, considering the above angiographic pictures along with severe

calcification it was felt the patient would benefit from left internal mammary

graft to the LAD robotically.  We will discuss with cardiothoracic surgeon.  We

will arrange for the transfer.



Discussed with the patient and family.  They understand.  No untoward

complications were noted.





DD: 08/27/2018 09:55

DT: 08/27/2018 10:36

JOB# [id number]  [id number]

DRK/NTS

## 2018-08-27 NOTE — PROGRESS NOTE
Assessment and Plan


S/p [facility] this AM which showed calcified left main and LAD with severe ostial LAD 

lesion. Pt to be transferred to [place] for further evaluation and management. 





The patient has been seen in conjunction with Dr. Romero who agrees with the 

assessment and plan of care.  








- Patient Problems


(1) Chest pain


Current Visit: Yes   Status: Acute   





(2) CAD (coronary artery disease)


Current Visit: Yes   Status: Acute   





(3) HTN (hypertension)


Current Visit: Yes   Status: Chronic   





(4) Diabetes


Current Visit: Yes   Status: Chronic   


Qualifiers: 


   Diabetes mellitus type: type 1   Diabetes mellitus complication status: with 

neurologic complications 





Subjective


Date of service: 08/27/18


Principal diagnosis: chest pain


Interval history: 


pt seen s/p [facility]. no current cardiac complaints. 








Objective





  Last Vital Signs











Temp  97.9 F   08/27/18 04:58


 


Pulse  66   08/27/18 04:58


 


Resp  18   08/27/18 04:58


 


BP  137/71   08/27/18 04:58


 


Pulse Ox  93   08/27/18 04:58























- Physical Examination


General: No Apparent Distress


HEENT: Positive: PERRL, Normocephaly, Mucus Membranes Moist


Neck: Positive: neck supple, trachea midline


Cardiac: Positive: Reg Rate and Rhythm, S1/S2


Lungs: Positive: clear to auscultation


Neuro: Positive: Grossly Intact


Abdomen: Positive: Soft.  Negative: Tender


Skin: Negative: Rash, Wound


Musculoskeletal: No Fluid Collection, No Pain, Normal Range of Motion


Extremities: Absent: edema





- Labs and Meds


 Coagulation











  08/27/18 Range/Units





  05:16 


 


PT  13.1  (12.2-14.9)  Sec.


 


INR  0.94  (0.87-1.13)  


 


APTT  28.8  (24.2-36.6)  Sec.








 Comprehensive Metabolic Panel











  08/27/18 Range/Units





  05:16 


 


Sodium  135 L  (137-145)  mmol/L


 


Potassium  3.3 L  (3.6-5.0)  mmol/L


 


Chloride  95.8 L  ()  mmol/L


 


Carbon Dioxide  23  (22-30)  mmol/L


 


BUN  14  (9-20)  mg/dL


 


Creatinine  0.8  (0.8-1.5)  mg/dL


 


Glucose  110 H  ()  mg/dL


 


Calcium  9.3  (8.4-10.2)  mg/dL














- Imaging and Cardiology


Cardiac cath: report reviewed





- Telemetry


EKG Rhythm: Sinus Rhythm

## 2018-08-28 VITALS — SYSTOLIC BLOOD PRESSURE: 137 MMHG | DIASTOLIC BLOOD PRESSURE: 76 MMHG

## 2018-09-27 ENCOUNTER — HOSPITAL ENCOUNTER (EMERGENCY)
Dept: HOSPITAL 5 - ED | Age: 78
LOS: 1 days | Discharge: HOME | End: 2018-09-28
Payer: MEDICARE

## 2018-09-27 VITALS — SYSTOLIC BLOOD PRESSURE: 142 MMHG | DIASTOLIC BLOOD PRESSURE: 60 MMHG

## 2018-09-27 DIAGNOSIS — Z95.1: ICD-10-CM

## 2018-09-27 DIAGNOSIS — Z88.6: ICD-10-CM

## 2018-09-27 DIAGNOSIS — I10: Primary | ICD-10-CM

## 2018-09-27 DIAGNOSIS — E11.9: ICD-10-CM

## 2018-09-27 DIAGNOSIS — J45.909: ICD-10-CM

## 2018-09-27 DIAGNOSIS — Z88.0: ICD-10-CM

## 2018-09-27 DIAGNOSIS — Z91.041: ICD-10-CM

## 2018-09-27 LAB
APTT BLD: 26.2 SEC. (ref 24.2–36.6)
BASOPHILS # (AUTO): 0.1 K/MM3 (ref 0–0.1)
BASOPHILS NFR BLD AUTO: 0.7 % (ref 0–1.8)
BUN SERPL-MCNC: 6 MG/DL (ref 9–20)
BUN/CREAT SERPL: 9 %
CALCIUM SERPL-MCNC: 9.3 MG/DL (ref 8.4–10.2)
EOSINOPHIL # BLD AUTO: 0.4 K/MM3 (ref 0–0.4)
EOSINOPHIL NFR BLD AUTO: 3.9 % (ref 0–4.3)
HCT VFR BLD CALC: 38 % (ref 35.5–45.6)
HEMOLYSIS INDEX: 2
HGB BLD-MCNC: 12.9 GM/DL (ref 11.8–15.2)
INR PPP: 0.93 (ref 0.87–1.13)
LYMPHOCYTES # BLD AUTO: 3 K/MM3 (ref 1.2–5.4)
LYMPHOCYTES NFR BLD AUTO: 32.1 % (ref 13.4–35)
MCH RBC QN AUTO: 29 PG (ref 28–32)
MCHC RBC AUTO-ENTMCNC: 34 % (ref 32–34)
MCV RBC AUTO: 84 FL (ref 84–94)
MONOCYTES # (AUTO): 1 K/MM3 (ref 0–0.8)
MONOCYTES % (AUTO): 10.7 % (ref 0–7.3)
PLATELET # BLD: 401 K/MM3 (ref 140–440)
RBC # BLD AUTO: 4.51 M/MM3 (ref 3.65–5.03)

## 2018-09-27 PROCEDURE — 71046 X-RAY EXAM CHEST 2 VIEWS: CPT

## 2018-09-27 PROCEDURE — 84484 ASSAY OF TROPONIN QUANT: CPT

## 2018-09-27 PROCEDURE — 80048 BASIC METABOLIC PNL TOTAL CA: CPT

## 2018-09-27 PROCEDURE — 93005 ELECTROCARDIOGRAM TRACING: CPT

## 2018-09-27 PROCEDURE — 99284 EMERGENCY DEPT VISIT MOD MDM: CPT

## 2018-09-27 PROCEDURE — 85025 COMPLETE CBC W/AUTO DIFF WBC: CPT

## 2018-09-27 PROCEDURE — 85730 THROMBOPLASTIN TIME PARTIAL: CPT

## 2018-09-27 PROCEDURE — 36415 COLL VENOUS BLD VENIPUNCTURE: CPT

## 2018-09-27 PROCEDURE — 93010 ELECTROCARDIOGRAM REPORT: CPT

## 2018-09-27 PROCEDURE — 85610 PROTHROMBIN TIME: CPT

## 2018-09-27 NOTE — EMERGENCY DEPARTMENT REPORT
ED General Adult HPI





- General


Chief complaint: High BP


Stated complaint: HIGH BP


Time Seen by Provider: 09/27/18 22:13


Source: patient


Mode of arrival: Wheelchair


Limitations: No Limitations





- History of Present Illness


Initial comments: 





77-year-old male presents to ED with complaints of blood pressure.  Patient 

status post coronary artery bypass surgery 10 days ago at [facility].  Caretaker 

said patient had follow-up appointment with surgeon 2 days ago and blood 

pressure was high at that time.  Lisinopril was added to dictation ends.  

Caretaker states yesterday systolic blood pressure was in the 150s.  This 

morning and SBP was 170s.  Tonight BP was in the 180s/90s.  States he called 

the nurse line and was instructed to come to the emergency room.  EMS got a 

blood pressure of 170/90.  Patient denies headache shortness of breath.  

Reports mild chest pressure worse with sitting up in a chair.  States now that 

he is laying in stretcher pressure has resolved.


-: Gradual, days(s) (2)


Improves with: other (laying down)


Worsens with: other (sitting up)


Associated Symptoms: chest pain.  denies: fever/chills, headaches, nausea/

vomiting, shortness of breath





- Related Data


 Home Medications











 Medication  Instructions  Recorded  Confirmed  Last Taken


 


Ezetimibe [Zetia] 10 mg PO QDAY 08/23/18 08/23/18 Unknown


 


Levothyroxine Sodium [Synthroid] 175 mcg PO QDAY 08/23/18 08/23/18 Unknown


 


Lisinopril [Zestril] 40 mg PO QDAY 08/23/18 08/23/18 08/23/18








 Previous Rx's











 Medication  Instructions  Recorded  Last Taken  Type


 


Metoprolol [Lopressor TAB] 37.5 mg PO BID #60 tablet 02/10/18 08/23/18 Rx











 Allergies











Allergy/AdvReac Type Severity Reaction Status Date / Time


 


codeine Allergy  Itching Verified 01/03/18 14:14


 


Iodinated Contrast- Oral and Allergy  Anaphylaxis Verified 08/24/18 07:32





IV Dye     


 


penicillin Allergy  Rash Verified 01/03/18 14:14


 


aspirin AdvReac  Unknown Verified 01/03/18 14:14














ED Review of Systems


ROS: 


Stated complaint: HIGH BP


Other details as noted in HPI





Comment: All other systems reviewed and negative


Constitutional: denies: fever


Respiratory: denies: shortness of breath


Cardiovascular: chest pain


Gastrointestinal: denies: nausea, vomiting





ED Past Medical Hx





- Past Medical History


Previous Medical History?: Yes


Hx Hypertension: Yes


Hx CVA: Yes (L sided deficits)


Hx Diabetes: Yes


Hx Asthma: Yes


Hx HIV: No


Additional medical history: Graves dx.  gastric ulcers





- Surgical History


Past Surgical History?: Yes


Additional Surgical History: knee OR bilat.  thyroid OR.  robotic heart surgery 

10 days ago





- Social History


Smoking Status: Never Smoker


Substance Use Type: None





- Medications


Home Medications: 


 Home Medications











 Medication  Instructions  Recorded  Confirmed  Last Taken  Type


 


Metoprolol [Lopressor TAB] 37.5 mg PO BID #60 tablet 02/10/18 08/23/18 08/23/18 

Rx


 


Ezetimibe [Zetia] 10 mg PO QDAY 08/23/18 08/23/18 Unknown History


 


Levothyroxine Sodium [Synthroid] 175 mcg PO QDAY 08/23/18 08/23/18 Unknown 

History


 


Lisinopril [Zestril] 40 mg PO QDAY 08/23/18 08/23/18 08/23/18 History














ED Physical Exam





- General


Limitations: No Limitations


General appearance: alert, in no apparent distress





- Head


Head exam: Present: atraumatic, normocephalic





- Eye


Eye exam: Present: normal appearance





- ENT


ENT exam: Present: normal exam





- Respiratory


Respiratory exam: Present: normal lung sounds bilaterally.  Absent: respiratory 

distress





- Cardiovascular


Cardiovascular Exam: Present: regular rate, normal rhythm, other (chest wall 

incision site appears clean, nonerythematous)





- GI/Abdominal


GI/Abdominal exam: Present: soft.  Absent: tenderness





- Extremities Exam


Extremities exam: Present: normal inspection





- Neurological Exam


Neurological exam: Present: alert, oriented X3, motor sensory deficit (baseline 

left-sided weakness due to previous CVA)





- Psychiatric


Psychiatric exam: Present: normal affect, normal mood





- Skin


Skin exam: Present: warm, dry, intact, normal color





ED Course


 Vital Signs











  09/27/18 09/27/18 09/27/18





  17:54 20:07 22:32


 


Temperature 98.8 F 98.5 F 


 


Pulse Rate 86 91 H 64


 


Respiratory 18 14 16





Rate   


 


Blood Pressure 166/87 143/73 


 


Blood Pressure   142/60





[Left]   


 


O2 Sat by Pulse 100 95 94





Oximetry   














  09/28/18





  01:14


 


Temperature 


 


Pulse Rate 


 


Respiratory 96 H





Rate 


 


Blood Pressure 


 


Blood Pressure 





[Left] 


 


O2 Sat by Pulse 96





Oximetry 














- Consultations


Consultation #1: 





09/27/18 23:24


Dr Carpio paged.








09/28/18 00:02


Spoke with Dr. Joshi returned page for Dr. Carpio.  Informed him of initial 

elevated BP and current BP of 140s over 70s.  Also informed him of the patient'

s report of chest pressure, EKG findings and, normal troponin 2.  States to 

tell patient to follow up in the office if chest pressure returns or if blood 

pressure becomes elevated again.  States no need for admission or transfer at 

this time.





ED Medical Decision Making





- Lab Data


Result diagrams: 


 09/27/18 18:14





 09/27/18 18:14





- EKG Data


-: EKG Interpreted by Me


EKG shows normal: sinus rhythm, axis, QRS complexes, ST-T waves


Rate: normal





- EKG Data


Interpretation: other (prolonged QT, Q waves present inferiorly and anteriorly)





- Radiology Data


Radiology results: report reviewed, image reviewed





- Medical Decision Making





76 yo M w/ elevated BP.  Improved w/o giving any meds for blood pressure.  Pt 

reports chest pressure has improved, seems to be positional.  Spoke w/ Dr Joshi

, advised pt to f/u in the office if chest pressure returns or BP becomes 

elevated again.  This was relayed to pt.  Will d/c home.





- Differential Diagnosis


essential HTN, HTN emergency, ACS


Critical care attestation.: 


If time is entered above; I have spent that time in minutes in the direct care 

of this critically ill patient, excluding procedure time.








ED Disposition


Clinical Impression: 


 Essential hypertension





Disposition: DC-01 TO HOME OR SELFCARE


Is pt being admited?: No


Condition: Stable


Instructions:  Hypertension (ED)


Referrals: 


PRIMARY CARE,MD [Primary Care Provider] - 3-5 Days


Time of Disposition: 00:07

## 2018-09-27 NOTE — XRAY REPORT
FINAL REPORT



EXAM:  XR CHEST ROUTINE 2V



HISTORY:  high bp 



TECHNIQUE:  Frontal and lateral chest x-ray.



PRIORS:  8 February 2018.



FINDINGS:  

Cardiac and mediastinal silhouette within normal limits.  



Lungs again show mild elevation or eventration of right

hemidiaphragm about the same. 



No focal consolidation, apparent pleural effusion or

pneumothorax. 



Degenerative change in the thoracic spine. 



IMPRESSION:  

1. Stable examination. No acute findings.

## 2019-06-28 ENCOUNTER — HOSPITAL ENCOUNTER (INPATIENT)
Dept: HOSPITAL 5 - ED | Age: 79
LOS: 2 days | Discharge: HOME | DRG: 916 | End: 2019-06-30
Attending: INTERNAL MEDICINE | Admitting: INTERNAL MEDICINE
Payer: MEDICARE

## 2019-06-28 DIAGNOSIS — T78.3XXA: Primary | ICD-10-CM

## 2019-06-28 DIAGNOSIS — J45.909: ICD-10-CM

## 2019-06-28 DIAGNOSIS — I10: ICD-10-CM

## 2019-06-28 DIAGNOSIS — Z88.6: ICD-10-CM

## 2019-06-28 DIAGNOSIS — L29.9: ICD-10-CM

## 2019-06-28 DIAGNOSIS — Z91.041: ICD-10-CM
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
## 2019-06-28 DIAGNOSIS — E05.00: ICD-10-CM

## 2019-06-28 DIAGNOSIS — Z88.5: ICD-10-CM

## 2019-06-28 DIAGNOSIS — Z79.899: ICD-10-CM

## 2019-06-28 DIAGNOSIS — T78.49XA: ICD-10-CM

## 2019-06-28 DIAGNOSIS — Z88.0: ICD-10-CM

## 2019-06-28 DIAGNOSIS — I69.354: ICD-10-CM

## 2019-06-28 DIAGNOSIS — E11.9: ICD-10-CM

## 2019-06-28 DIAGNOSIS — Z91.038: ICD-10-CM

## 2019-06-28 DIAGNOSIS — T63.481A: ICD-10-CM

## 2019-06-28 LAB
BUN SERPL-MCNC: 9 MG/DL (ref 9–20)
BUN/CREAT SERPL: 11 %
CALCIUM SERPL-MCNC: 8.6 MG/DL (ref 8.4–10.2)
HCT VFR BLD CALC: 41.1 % (ref 35.5–45.6)
HEMOLYSIS INDEX: 7
HGB BLD-MCNC: 14.1 GM/DL (ref 11.8–15.2)
MCHC RBC AUTO-ENTMCNC: 34 % (ref 32–34)
MCV RBC AUTO: 86 FL (ref 84–94)
PLATELET # BLD: 283 K/MM3 (ref 140–440)
RBC # BLD AUTO: 4.77 M/MM3 (ref 3.65–5.03)

## 2019-06-28 PROCEDURE — 85027 COMPLETE CBC AUTOMATED: CPT

## 2019-06-28 PROCEDURE — 96375 TX/PRO/DX INJ NEW DRUG ADDON: CPT

## 2019-06-28 PROCEDURE — 93010 ELECTROCARDIOGRAM REPORT: CPT

## 2019-06-28 PROCEDURE — 83735 ASSAY OF MAGNESIUM: CPT

## 2019-06-28 PROCEDURE — 82962 GLUCOSE BLOOD TEST: CPT

## 2019-06-28 PROCEDURE — 85025 COMPLETE CBC W/AUTO DIFF WBC: CPT

## 2019-06-28 PROCEDURE — 36415 COLL VENOUS BLD VENIPUNCTURE: CPT

## 2019-06-28 PROCEDURE — 93005 ELECTROCARDIOGRAM TRACING: CPT

## 2019-06-28 PROCEDURE — 82550 ASSAY OF CK (CPK): CPT

## 2019-06-28 PROCEDURE — 96374 THER/PROPH/DIAG INJ IV PUSH: CPT

## 2019-06-28 PROCEDURE — 71045 X-RAY EXAM CHEST 1 VIEW: CPT

## 2019-06-28 PROCEDURE — 80048 BASIC METABOLIC PNL TOTAL CA: CPT

## 2019-06-28 RX ADMIN — Medication SCH ML: at 21:52

## 2019-06-28 RX ADMIN — FAMOTIDINE SCH MG: 10 INJECTION, SOLUTION INTRAVENOUS at 21:51

## 2019-06-28 RX ADMIN — METOPROLOL TARTRATE SCH MG: 25 TABLET, FILM COATED ORAL at 23:13

## 2019-06-28 NOTE — HISTORY AND PHYSICAL REPORT
History of Present Illness


Date of examination: 06/28/19


Date of admission: 


06/28/19 07:08





Chief complaint: 





tongue swelling


History of present illness: 





78-year-old male with a past medical history asthma, CVA with left-sided 

deficit, diabetes, hypertension, Graves' disease, and previous gastric ulcers 

presents to the hospital with complaints of allergic reaction that started just 

prior to arrival. Patient was bit by ants around 8 PM.  Patient began to have 

swelling to the tongue, swelling to hands right greater than left, and a 

pruritic urticarial rash.  Patient complained of some mild difficulty swallowing

initially without respiratory distress.  Patient received Benadryl 50 mg IM,  IV

Solu-Medrol and IV Pepcid per ER record.  Patient reports improved pruritus and 

decreased swelling of the tongue back to baseline upon my evaluation.  Patient 

also is on lisinopril.  He denies any new food, medicine, or soap exposures.  No

CP or SOB.  No cough or cold sxs








Past History


Past Medical History: diabetes, hypertension, stroke, other (asthma, Graves dz)


Past Surgical History: No surgical history


Social history: no significant social history


Family history: no significant family history





Medications and Allergies


                                    Allergies











Allergy/AdvReac Type Severity Reaction Status Date / Time


 


codeine Allergy  Itching Verified 01/03/18 14:14


 


Iodinated Contrast- Oral and Allergy  Anaphylaxis Verified 08/24/18 07:32





IV Dye     


 


penicillin Allergy  Rash Verified 01/03/18 14:14


 


aspirin AdvReac  Unknown Verified 01/03/18 14:14


 


lisinopril AdvReac  Angioedema Verified 06/28/19 13:06











                                Home Medications











 Medication  Instructions  Recorded  Confirmed  Last Taken  Type


 


Metoprolol [Lopressor TAB] 37.5 mg PO BID #60 tablet 02/10/18 06/28/19 06/27/19 

22:00 Rx


 


Levothyroxine Sodium [Synthroid] 175 mcg PO QDAY 08/23/18 06/28/19 06/27/19 

06:00 History


 


Lisinopril [Zestril] 40 mg PO QDAY 08/23/18 06/28/19 06/27/19 08:00 History


 


Atorvastatin Calcium [Lipitor] 80 mg PO QHS 06/28/19 06/28/19 06/27/19 22:00 

History


 


Clopidogrel [Plavix] 75 mg PO QDAY 06/28/19 06/28/19 06/27/19 08:00 History


 


Insulin Lispro Protamin/Lispro 0 unit SQ TID 06/28/19 06/28/19 06/28/19 History





[Humalog Mix 50-50 Vial]     


 


hydroCHLOROthiazide [Hctz] 12.5 mg PO QDAY 06/28/19 06/28/19 06/28/19 08:00 

History














Review of Systems


All systems: negative





Exam





- Constitutional


Vitals: 


                                        











Temp Pulse Resp BP Pulse Ox


 


 98.6 F   90   18   134/76   98 


 


 06/28/19 12:07  06/28/19 12:07  06/28/19 12:07  06/28/19 12:07  06/28/19 12:07











General appearance: Present: no acute distress, well-nourished





- EENT


Eyes: Present: PERRL


ENT: hearing intact, clear oral mucosa





- Neck


Neck: Present: supple, normal ROM





- Respiratory


Respiratory effort: normal


Respiratory: bilateral: CTA





- Cardiovascular


Heart Sounds: Present: S1 & S2.  Absent: rub, click





- Extremities


Extremities: pulses symmetrical, No edema


Peripheral Pulses: within normal limits





- Abdominal


General gastrointestinal: Present: soft, non-tender, non-distended, normal bowel

 sounds


Male genitourinary: Present: normal





- Integumentary


Integumentary: Present: clear, warm, dry





- Musculoskeletal


Musculoskeletal: gait normal, strength equal bilaterally





- Psychiatric


Psychiatric: appropriate mood/affect, intact judgment & insight





- Neurologic


Neurologic: CNII-XII intact, moves all extremities





Results





- Labs


CBC & Chem 7: 


                                 06/28/19 04:50





                                 06/28/19 04:50


Labs: 


                             Laboratory Last Values











WBC  9.6 K/mm3 (4.5-11.0)   06/28/19  04:50    


 


RBC  4.77 M/mm3 (3.65-5.03)   06/28/19  04:50    


 


Hgb  14.1 gm/dl (11.8-15.2)   06/28/19  04:50    


 


Hct  41.1 % (35.5-45.6)   06/28/19  04:50    


 


MCV  86 fl (84-94)   06/28/19  04:50    


 


MCH  30 pg (28-32)   06/28/19  04:50    


 


MCHC  34 % (32-34)   06/28/19  04:50    


 


RDW  14.1 % (13.2-15.2)   06/28/19  04:50    


 


Plt Count  283 K/mm3 (140-440)   06/28/19  04:50    


 


Sodium  136 mmol/L (137-145)  L  06/28/19  04:50    


 


Potassium  4.0 mmol/L (3.6-5.0)   06/28/19  04:50    


 


Chloride  101.4 mmol/L ()   06/28/19  04:50    


 


Carbon Dioxide  24 mmol/L (22-30)   06/28/19  04:50    


 


  15 mmol/L  06/28/19  04:50    


 


BUN  9 mg/dL (9-20)   06/28/19  04:50    


 


  0.8 mg/dL (0.8-1.5)   06/28/19  04:50    


 


Estimated GFR  > 60 ml/min  06/28/19  04:50    


 


  11 %  06/28/19  04:50    


 


Glucose  135 mg/dL ()  H  06/28/19  04:50    


 


POC Glucose  223  ()  H  06/28/19  12:14    


 


Calcium  8.6 mg/dL (8.4-10.2)   06/28/19  04:50    


 


Magnesium  1.80 mg/dL (1.7-2.3)   06/28/19  04:50    


 


  85 units/L ()   06/28/19  04:50    














Assessment and Plan


Assessment and plan: 





Allergic rxn/Angioedema.  Pt likely with rxn to lisinopril.  Pepcid, Benadryl 

and Solumedrol.





hx CVA.  Stable





Asthma.  Compensated





Graves dz. Stable





PUD.  PPI daily





DMII.  Accuchecks and SSRI





HTN.  Added lisinopril to allergy list.  Monior BP and add Norvasc if needed

## 2019-06-28 NOTE — XRAY REPORT
PROCEDURE: XR CHEST 1V AP 

 

TECHNIQUE:  Chest radiograph single view.  

 

HISTORY: allergic reaction 

 

COMPARISONS: None . 

 

FINDINGS: Single frontal view of the chest was acquired. Comparison is made to the prior examination 
of September 27, 2018. 

 

There is cardiomegaly, similar to prior exam. There is no evidence of congestive heart failure. There
 is no consolidative infiltrate. 

 

IMPRESSION: Cardiomegaly. Otherwise, no active disease in the chest 

 

This document is electronically signed by Sohail Flood MD., June 28 2019 04:52:25 AM ET

## 2019-06-28 NOTE — EVENT NOTE
Date: 06/28/19





This is a pleasant 78-year-old gentleman, disabled from her previous stroke, 

presenting to the emergency room with nontraumatic right lower extremity 

urticarial rash, uncertain what the logic agent is, and also simultaneous tongue

swelling.  Patient on lisinopril.  He does have minimal lingual angioedema.  He 

is phonating normally, and protecting his airway at this time.  He is given 

Benadryl in the field by EMS.  We will obtain basic laboratory studies, x-ray of

the chest, given Pepcid, Benadryl, placed on cardiac monitor, and observed.


                                   Vital Signs











  06/28/19





  04:20


 


Temperature 98.5 F


 


Pulse Rate 79


 


Respiratory 12





Rate 


 


Blood Pressure 164/88


 


Blood Pressure 164/88





[Right] 


 


O2 Sat by Pulse 99





Oximetry

## 2019-06-28 NOTE — EMERGENCY DEPARTMENT REPORT
HPI





- General


Chief Complaint: Allergic Reaction


Time Seen by Provider: 06/28/19 06:11





- HPI


HPI: 





78-year-old male with a past medical history asthma, CVA with left-sided 

deficit, diabetes, hypertension, Graves' disease, and previous gastric ulcers 

presents to the hospital with complaints of allergic reaction and started just 

prior to arrival.  Arrival time 4:15 AM.  Patient was bit by ants around 8 PM.  

Patient began to have swelling to the tongue, swelling to hands right greater 

than left, and a pruritic urticarial rash.  Patient complain of some mild 

difficulty swallowing initially without respiratory distress.  Patient received 

Benadryl 50 mg IM a row and then IV Solu-Medrol and IV Pepcid prior to my 

evaluation.  Patient reports improved pruritus and decreased swelling but not 

back to baseline.  Patient also is on lisinopril.  He denies any new food, 

medicine, or soap exposures.





ED Past Medical Hx





- Past Medical History


Previous Medical History?: Yes


Hx Hypertension: Yes


Hx CVA: Yes (L sided deficits)


Hx Diabetes: Yes


Hx Asthma: Yes


Hx HIV: No


Additional medical history: Graves dx.  gastric ulcers





- Surgical History


Past Surgical History?: Yes


Additional Surgical History: knee OR bilat.  thyroid OR.  robotic heart surgery 

10 days ago





- Social History


Smoking Status: Never Smoker


Substance Use Type: None





- Medications


Home Medications: 


                                Home Medications











 Medication  Instructions  Recorded  Confirmed  Last Taken  Type


 


Metoprolol [Lopressor TAB] 37.5 mg PO BID #60 tablet 02/10/18 08/23/18 08/23/18 

Rx


 


Ezetimibe [Zetia] 10 mg PO QDAY 08/23/18 08/23/18 Unknown History


 


Levothyroxine Sodium [Synthroid] 175 mcg PO QDAY 08/23/18 08/23/18 Unknown 

History


 


Lisinopril [Zestril] 40 mg PO QDAY 08/23/18 08/23/18 08/23/18 History


 


Ciprofloxacin HCl [Ciprofloxacin 500 mg PO Q12H #20 tab 11/03/18  Unknown Rx





TAB]     


 


metroNIDAZOLE [Flagyl] 500 mg PO Q12HR #20 tab 11/03/18  Unknown Rx


 


Dicyclomine [Bentyl] 10 mg PO QID #15 capsule 11/06/18  Unknown Rx


 


traMADol [Ultram] 50 mg PO Q6HR PRN #12 tablet 11/06/18  Unknown Rx


 


Acetaminophen 500 mg PO Q8H PRN #20 tablet 11/07/18  Unknown Rx














ED Review of Systems


ROS: 


Stated complaint: ALLERGIC REACTION


Other details as noted in HPI





Comment: All other systems reviewed and negative





Physical Exam





- Physical Exam


Vital Signs: 


                                   Vital Signs











  06/28/19 06/28/19 06/28/19





  04:20 05:00 06:10


 


Temperature 98.5 F  


 


Pulse Rate 79  72


 


Respiratory 12 18 21





Rate   


 


Blood Pressure 164/88  


 


Blood Pressure 164/88  152/81





[Right]   


 


O2 Sat by Pulse 99 99 95





Oximetry   











Physical Exam: 





General: No limitations


Head exam: Atraumatic


Eyes exam: Normal appearance


ENT: Moist mucous membrane, mild to moderate tongue swelling and patient reports

 that it makes it more difficult for him to talk.


Neck exam: Normal inspection, full range of motion, no meningismus nontender, no

 stridor


Respiratory exam: Clear to auscultation bilateral, no wheezes, rales, crackles


Cardiovascular: Normal rate and rhythm


Abdomen: Soft, nondistended, and  nontender, with normal bowel sounds, no 

rebound, or guarding


Extremity: Full range of motion normal inspection no deformity


Back: Normal Inspection, full range of motion, no tenderness


Neurologic: Alert, oriented x3, left-sided weakness compared to the right 

chronic and at baseline.


Psychiatric: normal affect, normal mood


Skin: Warm, dry, intact redness to bilateral palms of hands





ED Course


                                   Vital Signs











  06/28/19 06/28/19 06/28/19





  04:20 05:00 06:10


 


Temperature 98.5 F  


 


Pulse Rate 79  72


 


Respiratory 12 18 21





Rate   


 


Blood Pressure 164/88  


 


Blood Pressure 164/88  152/81





[Right]   


 


O2 Sat by Pulse 99 99 95





Oximetry   














ED Medical Decision Making





- Lab Data


Result diagrams: 


                                 06/28/19 04:50





                                 06/28/19 04:50





                                   Lab Results











  06/28/19 06/28/19 06/28/19 Range/Units





  04:50 04:50 04:50 


 


WBC  9.6    (4.5-11.0)  K/mm3


 


RBC  4.77    (3.65-5.03)  M/mm3


 


Hgb  14.1    (11.8-15.2)  gm/dl


 


Hct  41.1    (35.5-45.6)  %


 


MCV  86    (84-94)  fl


 


MCH  30    (28-32)  pg


 


MCHC  34    (32-34)  %


 


RDW  14.1    (13.2-15.2)  %


 


Plt Count  283    (140-440)  K/mm3


 


Sodium   136 L   (137-145)  mmol/L


 


Potassium   4.0   (3.6-5.0)  mmol/L


 


Chloride   101.4   ()  mmol/L


 


Carbon Dioxide   24   (22-30)  mmol/L


 


Anion Gap   15   mmol/L


 


BUN   9   (9-20)  mg/dL


 


Creatinine   0.8   (0.8-1.5)  mg/dL


 


Estimated GFR   > 60   ml/min


 


BUN/Creatinine Ratio   11   %


 


Glucose   135 H   ()  mg/dL


 


Calcium   8.6   (8.4-10.2)  mg/dL


 


Magnesium    1.80  (1.7-2.3)  mg/dL


 


Total Creatine Kinase    85  ()  units/L














- EKG Data


-: EKG Interpreted by Me


EKG shows normal: sinus rhythm, axis (qrs -1), QRS complexes (qrsd 97), ST-T 

waves (no stemi)


Rate: normal (72)





- EKG Data


When compared to previous EKG there are: no significant change (9/27/18)





- Radiology Data


Radiology results: report reviewed








PROCEDURE: XR CHEST 1V AP 





TECHNIQUE: Chest radiograph single view. 





HISTORY: allergic reaction 





COMPARISONS: None . 





FINDINGS: Single frontal view of the chest was acquired. Comparison is made to 

the prior 


examination of September 27, 2018. 





There is cardiomegaly, similar to prior exam. There is no evidence of congestive

 heart failure. 


There is no consolidative infiltrate. 





IMPRESSION: Cardiomegaly. Otherwise, no active disease in the chest 





- Medical Decision Making





Patient has persistent tongue swelling without any airway compromise at this 

time.  We'll admit to the hospital for further treatment and observation





- Differential Diagnosis


allergic reaction to insect bite, ACE inhibitor allergy, angioedema


Critical Care Time: No


Critical care attestation.: 


If time is entered above; I have spent that time in minutes in the direct care 

of this critically ill patient, excluding procedure time.








ED Disposition


Clinical Impression: 


 Acute allergic reaction, On angiotensin-converting enzyme (ACE) inhibitors, 

Allergy to ant bite





Disposition: DC-09 OP ADMIT IP TO THIS HOSP


Is pt being admited?: Yes


Condition: Stable


Time of Disposition: 07:07 (DR moore/hosp)

## 2019-06-29 LAB
BASOPHILS # (AUTO): 0 K/MM3 (ref 0–0.1)
BASOPHILS NFR BLD AUTO: 0.1 % (ref 0–1.8)
BUN SERPL-MCNC: 17 MG/DL (ref 9–20)
BUN/CREAT SERPL: 19 %
CALCIUM SERPL-MCNC: 8.8 MG/DL (ref 8.4–10.2)
EOSINOPHIL # BLD AUTO: 0 K/MM3 (ref 0–0.4)
EOSINOPHIL NFR BLD AUTO: 0 % (ref 0–4.3)
HCT VFR BLD CALC: 38.8 % (ref 35.5–45.6)
HEMOLYSIS INDEX: 6
HGB BLD-MCNC: 13.2 GM/DL (ref 11.8–15.2)
LYMPHOCYTES # BLD AUTO: 1.4 K/MM3 (ref 1.2–5.4)
LYMPHOCYTES NFR BLD AUTO: 12.2 % (ref 13.4–35)
MCHC RBC AUTO-ENTMCNC: 34 % (ref 32–34)
MCV RBC AUTO: 86 FL (ref 84–94)
MONOCYTES # (AUTO): 0.8 K/MM3 (ref 0–0.8)
MONOCYTES % (AUTO): 7.4 % (ref 0–7.3)
PLATELET # BLD: 294 K/MM3 (ref 140–440)
RBC # BLD AUTO: 4.5 M/MM3 (ref 3.65–5.03)

## 2019-06-29 RX ADMIN — METOPROLOL TARTRATE SCH MG: 25 TABLET, FILM COATED ORAL at 22:08

## 2019-06-29 RX ADMIN — CLOPIDOGREL BISULFATE SCH MG: 75 TABLET ORAL at 10:05

## 2019-06-29 RX ADMIN — FAMOTIDINE SCH MG: 10 INJECTION, SOLUTION INTRAVENOUS at 22:08

## 2019-06-29 RX ADMIN — Medication SCH ML: at 10:08

## 2019-06-29 RX ADMIN — HYDROCHLOROTHIAZIDE SCH MG: 12.5 CAPSULE ORAL at 10:06

## 2019-06-29 RX ADMIN — Medication SCH ML: at 22:08

## 2019-06-29 RX ADMIN — ENOXAPARIN SODIUM SCH MG: 100 INJECTION SUBCUTANEOUS at 10:06

## 2019-06-29 RX ADMIN — LEVOTHYROXINE SODIUM SCH MCG: 0.05 TABLET ORAL at 05:04

## 2019-06-29 RX ADMIN — LEVOTHYROXINE SODIUM SCH MCG: 0.12 TABLET ORAL at 05:04

## 2019-06-29 RX ADMIN — FAMOTIDINE SCH MG: 10 INJECTION, SOLUTION INTRAVENOUS at 10:06

## 2019-06-29 RX ADMIN — METOPROLOL TARTRATE SCH MG: 25 TABLET, FILM COATED ORAL at 10:06

## 2019-06-29 NOTE — PROGRESS NOTE
Assessment and Plan


Assessment and plan: 





Allergic rxn/Angioedema.  Pt likely with rxn to lisinopril.  Pepcid, Benadryl 

and Solumedrol.





hx CVA.  Stable





Asthma.  Compensated





Graves dz. Stable





PUD.  PPI daily





DMII.  Accuchecks and SSRI





HTN.  Added lisinopril to allergy list.  Monitor BP and add Norvasc if needed





History


Interval history: 





Pt reports dizziness with solumedrol





Hospitalist Physical





- Constitutional


Vitals: 


                                        











Temp Pulse Resp BP Pulse Ox


 


 98.3 F   81   18   141/70   98 


 


 06/29/19 11:34  06/29/19 11:34  06/29/19 11:36  06/29/19 11:34  06/29/19 11:34











General appearance: Present: no acute distress, well-nourished





- EENT


Eyes: Present: PERRL, EOM intact


ENT: hearing intact, clear oral mucosa, dentition normal





- Neck


Neck: Present: supple, normal ROM





- Respiratory


Respiratory effort: normal


Respiratory: bilateral: CTA





- Cardiovascular


Rhythm: regular


Heart Sounds: Present: S1 & S2.  Absent: gallop, rub





- Extremities


Extremities: no ischemia, No edema, Full ROM





- Abdominal


General gastrointestinal: soft, non-tender, non-distended, normal bowel sounds





- Integumentary


Integumentary: Present: clear, warm, dry





- Neurologic


Neurologic: CNII-XII intact, moves all extremities





Results





- Labs


CBC & Chem 7: 


                                 06/29/19 05:00





                                 06/29/19 05:00


Labs: 


                             Laboratory Last Values











WBC  11.2 K/mm3 (4.5-11.0)  H  06/29/19  05:00    


 


RBC  4.50 M/mm3 (3.65-5.03)   06/29/19  05:00    


 


Hgb  13.2 gm/dl (11.8-15.2)   06/29/19  05:00    


 


Hct  38.8 % (35.5-45.6)   06/29/19  05:00    


 


MCV  86 fl (84-94)   06/29/19  05:00    


 


MCH  29 pg (28-32)   06/29/19  05:00    


 


MCHC  34 % (32-34)   06/29/19  05:00    


 


RDW  14.1 % (13.2-15.2)   06/29/19  05:00    


 


Plt Count  294 K/mm3 (140-440)   06/29/19  05:00    


 


Lymph % (Auto)  12.2 % (13.4-35.0)  L  06/29/19  05:00    


 


Mono % (Auto)  7.4 % (0.0-7.3)  H  06/29/19  05:00    


 


Eos % (Auto)  0.0 % (0.0-4.3)   06/29/19  05:00    


 


Baso % (Auto)  0.1 % (0.0-1.8)   06/29/19  05:00    


 


Lymph #  1.4 K/mm3 (1.2-5.4)   06/29/19  05:00    


 


Mono #  0.8 K/mm3 (0.0-0.8)   06/29/19  05:00    


 


Eos #  0.0 K/mm3 (0.0-0.4)   06/29/19  05:00    


 


Baso #  0.0 K/mm3 (0.0-0.1)   06/29/19  05:00    


 


Seg Neutrophils %  80.3 % (40.0-70.0)  H  06/29/19  05:00    


 


Seg Neutrophils #  9.0 K/mm3 (1.8-7.7)  H  06/29/19  05:00    


 


Sodium  138 mmol/L (137-145)   06/29/19  05:00    


 


Potassium  4.1 mmol/L (3.6-5.0)   06/29/19  05:00    


 


Chloride  101.3 mmol/L ()   06/29/19  05:00    


 


Carbon Dioxide  23 mmol/L (22-30)   06/29/19  05:00    


 


  18 mmol/L  06/29/19  05:00    


 


BUN  17 mg/dL (9-20)   06/29/19  05:00    


 


  0.9 mg/dL (0.8-1.5)   06/29/19  05:00    


 


Estimated GFR  > 60 ml/min  06/29/19  05:00    


 


  19 %  06/29/19  05:00    


 


Glucose  258 mg/dL ()  H  06/29/19  05:00    


 


POC Glucose  268  ()  H  06/29/19  12:31    


 


Calcium  8.8 mg/dL (8.4-10.2)   06/29/19  05:00    


 


Magnesium  1.80 mg/dL (1.7-2.3)   06/28/19  04:50    


 


  85 units/L ()   06/28/19  04:50    














Active Medications





- Current Medications


Current Medications: 














Generic Name Dose Route Start Last Admin





  Trade Name Freq  PRN Reason Stop Dose Admin


 


Acetaminophen  650 mg  06/28/19 13:10 





  Tylenol  PO  





  Q4H PRN  





  Pain MILD(1-3)/Fever >100.5/HA  


 


Atorvastatin Calcium  80 mg  06/28/19 22:00  06/28/19 23:13





  Lipitor  PO   80 mg





  [facility] BI[facility]   Administration


 


Clopidogrel Bisulfate  75 mg  06/29/19 10:00  06/29/19 10:05





  Plavix  PO   75 mg





  QDAY BI[facility]   Administration


 


Diphenhydramine HCl  25 mg  06/28/19 13:10 





  Benadryl  IV  





  Q6H PRN  





  Itching  


 


Enoxaparin Sodium  40 mg  06/29/19 10:00  06/29/19 10:06





  Lovenox  SUB-Q   40 mg





  QDAY [facility]   Administration


 


Famotidine  20 mg  06/28/19 22:00  06/29/19 10:06





  Pepcid  IV   20 mg





  BID BI[facility]   Administration


 


Hydrochlorothiazide  12.5 mg  06/29/19 10:00  06/29/19 10:06





  Hctz  PO   12.5 mg





  QDAY [facility]   Administration


 


Insulin Human Regular  0 units  06/28/19 22:00  06/29/19 13:09





  Humulin R  SUB-Q   4 units





  [facility]   Administration





  Protocol  


 


Levothyroxine Sodium  125 mcg  06/29/19 06:00  06/29/19 05:04





  Synthroid  PO   125 mcg





  QDAY@0600 [facility]   Administration


 


Levothyroxine Sodium  50 mcg  06/29/19 06:00  06/29/19 05:04





  Synthroid  PO   50 mcg





  DAILY@0600 [facility]   Administration


 


Methylprednisolone Sodium Succinate  40 mg  06/28/19 16:00  06/29/19 03:56





  Solu-Medrol  IV   Not Given





  Q12H [facility]  


 


Metoprolol Tartrate  37.5 mg  06/28/19 22:00  06/29/19 10:06





  Lopressor  PO   37.5 mg





  BID BIENVENIDO   Administration


 


Ondansetron HCl  4 mg  06/28/19 13:10 





  Zofran  IV  





  Q8H PRN  





  Nausea And Vomiting  


 


Sodium Chloride  10 ml  06/28/19 22:00  06/29/19 10:08





  Sodium Chloride Flush Syringe 10 Ml  IV   10 ml





  BID BI[facility]O   Administration


 


Sodium Chloride  10 ml  06/28/19 13:10 





  Sodium Chloride Flush Syringe 10 Ml  IV  





  PRN PRN  





  LINE FLUSH

## 2019-06-30 VITALS — SYSTOLIC BLOOD PRESSURE: 166 MMHG | DIASTOLIC BLOOD PRESSURE: 91 MMHG

## 2019-06-30 RX ADMIN — Medication SCH ML: at 10:25

## 2019-06-30 RX ADMIN — CLOPIDOGREL BISULFATE SCH MG: 75 TABLET ORAL at 10:23

## 2019-06-30 RX ADMIN — ENOXAPARIN SODIUM SCH MG: 100 INJECTION SUBCUTANEOUS at 10:24

## 2019-06-30 RX ADMIN — METOPROLOL TARTRATE SCH MG: 25 TABLET, FILM COATED ORAL at 10:23

## 2019-06-30 RX ADMIN — HYDROCHLOROTHIAZIDE SCH MG: 12.5 CAPSULE ORAL at 10:23

## 2019-06-30 RX ADMIN — LEVOTHYROXINE SODIUM SCH MCG: 0.12 TABLET ORAL at 05:40

## 2019-06-30 RX ADMIN — FAMOTIDINE SCH MG: 10 INJECTION, SOLUTION INTRAVENOUS at 10:23

## 2019-06-30 RX ADMIN — LEVOTHYROXINE SODIUM SCH MCG: 0.05 TABLET ORAL at 05:40

## 2019-06-30 NOTE — DISCHARGE SUMMARY
Providers





- Providers


Date of Admission: 


06/28/19 07:08





Date of discharge: 06/30/19


Attending physician: 


RUDY COKER





Primary care physician: 


PRIMARY CARE MD








Hospitalization


Reason for admission: angioedema


Condition: Stable


Hospital course: 





78-year-old male with a past medical history asthma, CVA with left-sided 

deficit, diabetes, hypertension, Graves' disease, and previous gastric ulcers 

presented to the hospital with complaints of allergic reaction that started just

prior to arrival. Patient was bit by ants around 8 PM PTA.  Patient began to 

have swelling to the tongue, swelling to hands right greater than left, and a 

pruritic urticarial rash.  Patient complained of some mild difficulty swallowing

initially without respiratory distress.  Patient received Benadryl 50 mg IM,  IV

Solu-Medrol and IV Pepcid per ER record.  Patient reports improved pruritus and 

decreased swelling of the tongue back to baseline upon my evaluation.  Patient 

was also on lisinopril.  Patient was admitted with diagnosis of allergic 

reaction and angioedema.  Patient was maintained on Benadryl, Solu-Medrol and 

Pepcid.  Patient had significant improvement, resolution of symptoms and was 

felt to have received maximal hospital benefit or discharge.  Dedicated 

discharged on 32 minutes.


Disposition: DC-01 TO HOME OR SELFCARE


Time spent for discharge: 32





- Discharge Diagnoses


(1) Angioedema


Status: Acute   





(2) Acute allergic reaction


Status: Acute   





(3) Allergy to ant bite


Status: Acute   





Core Measure Documentation





- Palliative Care


Palliative Care/ Comfort Measures: Not Applicable





- Core Measures


Any of the following diagnoses?: none





Exam





- Constitutional


Vitals: 


                                        











Temp Pulse Resp BP Pulse Ox


 


 98.2 F   64   18   166/91   91 


 


 06/30/19 07:58  06/30/19 08:32  06/30/19 07:58  06/30/19 07:58  06/30/19 07:58











General appearance: Present: no acute distress, well-nourished





- EENT


Eyes: Present: PERRL


ENT: hearing intact, clear oral mucosa





- Neck


Neck: Present: supple, normal ROM





- Respiratory


Respiratory effort: normal


Respiratory: bilateral: CTA





- Cardiovascular


Heart Sounds: Present: S1 & S2.  Absent: rub, click





- Extremities


Extremities: pulses symmetrical, No edema


Peripheral Pulses: within normal limits





- Abdominal


General gastrointestinal: Present: soft, non-tender, non-distended, normal bowel

sounds


Male genitourinary: Present: normal





- Integumentary


Integumentary: Present: clear, warm, dry





- Musculoskeletal


Musculoskeletal: gait normal, strength equal bilaterally





- Psychiatric


Psychiatric: appropriate mood/affect, intact judgment & insight





- Neurologic


Neurologic: CNII-XII intact, moves all extremities





Plan


Activity: no restrictions


Weight Bearing Status: Full Weight Bearing


Diet: regular


Follow up with: 


[facility], MD [Referring] - 3-5 Days


Prescriptions: 


hydroCHLOROthiazide [HCTZ] 12.5 mg PO QDAY #30 capsule


Atorvastatin Calcium [Lipitor] 80 mg PO QHS #30 tablet


Metoprolol [Lopressor TAB] 37.5 mg PO BID #60 tablet


Amlodipine Besylate [Norvasc] 5 mg PO DAILY #30 tablet


Clopidogrel [Plavix] 75 mg PO QDAY #30 tablet


Levothyroxine Sodium [Synthroid] 175 mcg PO QDAY #30 tablet

## 2020-07-19 ENCOUNTER — HOSPITAL ENCOUNTER (EMERGENCY)
Dept: HOSPITAL 5 - ED | Age: 80
LOS: 1 days | Discharge: HOME | End: 2020-07-20
Payer: COMMERCIAL

## 2020-07-19 DIAGNOSIS — E11.9: ICD-10-CM

## 2020-07-19 DIAGNOSIS — Z79.899: ICD-10-CM

## 2020-07-19 DIAGNOSIS — Z79.4: ICD-10-CM

## 2020-07-19 DIAGNOSIS — K56.41: Primary | ICD-10-CM

## 2020-07-19 DIAGNOSIS — I10: ICD-10-CM

## 2020-07-19 DIAGNOSIS — J45.909: ICD-10-CM

## 2020-07-19 PROCEDURE — 74019 RADEX ABDOMEN 2 VIEWS: CPT

## 2020-07-19 NOTE — XRAY REPORT
ABDOMEN 2 VIEW(S)



INDICATION / CLINICAL INFORMATION:

Constipation.



COMPARISON: 

None available.



FINDINGS:



TUBES / LINES: None.

BOWEL GAS PATTERN: There is only a mild amount of stool throughout the colon. I see no evidence of ricky
wel obstruction or mass effect. 

FREE AIR / EXTRALUMINAL GAS: None seen.



ADDITIONAL FINDINGS: There are advanced degenerative changes involving both hip joints. There is mode
rate lower lumbar spondylosis. Mild vascular calcifications are present.



IMPRESSION: No acute abnormality.



Signer Name: Edvin Newman MD 

Signed: 7/19/2020 10:03 PM

Workstation Name: [id number]

## 2020-07-19 NOTE — EMERGENCY DEPARTMENT REPORT
HPI





- General


Chief Complaint: Pain General


Time Seen by Provider: 20 21:18





- HPI


HPI: 





Room 8








The patient is a 79-year-old male present with a chief complaint of 

constipation.  The patient states he normally requires medication to complete 

his bowel movement whenever he has the urge.  The patient states he has not had 

the urge to have a bowel movement 3 days ago but he decided to not take any 

medication to help himself go.  Patient states he now has the urge to go but is 

unable to have a bowel movement.  Patient states he has not had a bowel movement

in 3 days.  Patient states he is not passing flatus.  Patient denies nausea or 

vomiting.  Patient states he took over-the-counter laxatives as well as manually

disimpacted himself but was unable to place an enema.





ED Past Medical Hx





- Past Medical History


Previous Medical History?: Yes


Hx Hypertension: Yes


Hx CVA: Yes (L sided deficits)


Hx Diabetes: Yes


Hx Asthma: Yes


Additional medical history: Graves dx.  gastric ulcers, Constipation





- Surgical History


Past Surgical History?: Yes


Additional Surgical History: knee OR bilat.  thyroid OR.  robotic heart surgery 

10 days ago





- Family History


Family history: no significant





- Social History


Smoking Status: Never Smoker


Substance Use Type: None





- Medications


Home Medications: 


                                Home Medications











 Medication  Instructions  Recorded  Confirmed  Last Taken  Type


 


Insulin Lispro Protamin/Lispro 0 unit SQ TID 19 History





[HumaLOG Mix 50-50 VIAL]     


 


Amlodipine Besylate [Norvasc] 5 mg PO DAILY #30 tablet 19  Unknown Rx


 


Atorvastatin Calcium [Lipitor] 80 mg PO QHS #30 tablet 19  Unknown Rx


 


Clopidogrel [Plavix] 75 mg PO QDAY #30 tablet 19  Unknown Rx


 


Insulin Lispro Protamin/Lispro 25 units SQ TID 30 Days  vial 19  Unknown 

Rx





[HumaLOG Mix 50-50 VIAL]     


 


Levothyroxine Sodium [Synthroid] 175 mcg PO QDAY #30 tablet 19  Unknown Rx


 


Metoprolol [Lopressor TAB] 37.5 mg PO BID #60 tablet 19  Unknown Rx


 


hydroCHLOROthiazide [HCTZ] 12.5 mg PO QDAY #30 capsule 19  Unknown Rx


 


Polyethylene Glycol/Elect 4,000 ml PO ONCE PRN #1 bottle 20  Unknown Rx





[Golytely]     














ED Review of Systems


ROS: 


Stated complaint: CONSTIPATION


Other details as noted in HPI





Constitutional: no symptoms reported


Respiratory: no symptoms reported


Endocrine: no symptoms reported


Gastrointestinal: abdominal pain, constipation.  denies: nausea, vomiting





Physical Exam





- Physical Exam


Vital Signs: 


                                   Vital Signs











  20





  17:17 21:46 21:49


 


Temperature 98.5 F  


 


Pulse Rate 90  108 H


 


Respiratory 18 18 20





Rate   


 


Blood Pressure 179/82  


 


Blood Pressure   169/107





[Right]   


 


O2 Sat by Pulse 99  98





Oximetry   











Physical Exam: 





GENERAL: The patient is well-developed well-nourished male lying on stretcher no

t appearing to be in acute distress. []


HEENT: Normocephalic.  Atraumatic.  Extraocular motions are intact.  Patient has

 moist mucous membranes.


NECK: Supple.  Trachea midline


CHEST/LUNGS: Clear to auscultation.  There is no respiratory distress noted.


HEART/CARDIOVASCULAR: Regular.  There is no tachycardia.  There is no gallop rub

 or murmur.


ABDOMEN: Abdomen is soft, nontender.  Patient has normal bowel sounds.  There is

 no abdominal distention.


SKIN: There is no rash.  There is no edema.  There is no diaphoresis.


NEURO: The patient is awake, alert, and oriented.  The patient is cooperative. 

The patient has normal speech


MUSCULOSKELETAL: There is no evidence of acute injury.





ED Course


                                   Vital Signs











  20





  17:17 21:46 21:49


 


Temperature 98.5 F  


 


Pulse Rate 90  108 H


 


Respiratory 18 18 20





Rate   


 


Blood Pressure 179/82  


 


Blood Pressure   169/107





[Right]   


 


O2 Sat by Pulse 99  98





Oximetry   














- Reevaluation(s)


Reevaluation #1: 





20 00:03


Patient states he feels improved and no longer feels as though his abdomen is 

distended.  Strong warnings given





- Rectal Disimpaction


Consent Obtained: verbal consent


Time Out Performed: No


Indication: fecal impaction


Procedural Sedation: No


Sedation/Analgesia: none


Technique: manual disimpaction with


Result: significant stool output


Patient Tolerated Procedure: no complications





ED Medical Decision Making





- Radiology Data


Radiology results: report reviewed (Abdominal x-ray), image reviewed (Abdominal 

x-ray)


interpreted by me: 





Abdominal x-ray-constipation.  No air-fluid levels





[facility] [address] [place] [address] 

XRay Report Signed Patient: OBDULIO PUENTE MR#: [id number] : 

1940 Acct:[id number] Age/Sex: 79 / M ADM Date: 20 Loc: ED 

Attending Dr: Ordering Physician: SIVAKUMAR LINTON MD Date of Service: 20 

Procedure(s): XR abdomen 2V Accession Number(s): [id number] cc: SIVAKUMAR LINTON MD 

Fluoro Time In Minutes: ABDOMEN 2 VIEW(S) INDICATION / CLINICAL INFORMATION: 

Constipation. COMPARISON: None available. FINDINGS: TUBES / LINES: None. BOWEL 

GAS PATTERN: There is only a mild amount of stool throughout the colon. I see no

 evidence of bowel obstruction or mass effect. FREE AIR / EXTRALUMINAL GAS: None

 seen. ADDITIONAL FINDINGS: There are advanced degenerative changes involving 

both hip joints. There is moderate lower lumbar spondylosis. Mild vascular 

calcifications are present. IMPRESSION: No acute abnormality. Signer Name: 

Edvin Newman MD Signed: 2020 10:03 PM Workstation Name: [id number] 

Transcribed By: RT Dictated By: Edvin Newman MD Electronically 

Authenticated By: Edvin Newman MD Signed Date/Time: 20 DD/DT: 

20 TD/TT: 





- Differential Diagnosis


Constipation


Critical care attestation.: 


If time is entered above; I have spent that time in minutes in the direct care 

of this critically ill patient, excluding procedure time.








ED Disposition


Clinical Impression: 


 Fecal impaction





Disposition: DC-01 TO HOME OR SELFCARE


Is pt being admited?: No


Does the pt Need Aspirin: No


Condition: Stable


Instructions:  Fecal Impaction (ED), Obstipation (ED)


Additional Instructions: 


Return to the emergency department should you develop worsening symptoms, 

inability to tolerate food or liquids, high fever or any other concerns


Prescriptions: 


Polyethylene Glycol/Elect [Golytely] 4,000 ml PO ONCE PRN #1 bottle


 PRN Reason: Constipation


Referrals: 


JO ANN BRAND [Other] - 3-5 Days


HERMES COHEN MD [Staff Physician] - 3-5 Days (Dr. Cohen is a 

gastroenterologist.  Please follow-up with him for further evaluation)


Time of Disposition: 00:05

## 2020-07-20 VITALS — SYSTOLIC BLOOD PRESSURE: 160 MMHG | DIASTOLIC BLOOD PRESSURE: 78 MMHG

## 2021-03-11 ENCOUNTER — HOSPITAL ENCOUNTER (EMERGENCY)
Dept: HOSPITAL 5 - ED | Age: 81
Discharge: HOME | End: 2021-03-11
Payer: MEDICARE

## 2021-03-11 VITALS — SYSTOLIC BLOOD PRESSURE: 157 MMHG | DIASTOLIC BLOOD PRESSURE: 79 MMHG

## 2021-03-11 DIAGNOSIS — I10: ICD-10-CM

## 2021-03-11 DIAGNOSIS — Z98.890: ICD-10-CM

## 2021-03-11 DIAGNOSIS — J45.909: ICD-10-CM

## 2021-03-11 DIAGNOSIS — E11.9: ICD-10-CM

## 2021-03-11 DIAGNOSIS — Z79.4: ICD-10-CM

## 2021-03-11 DIAGNOSIS — Z79.899: ICD-10-CM

## 2021-03-11 DIAGNOSIS — Z86.73: ICD-10-CM

## 2021-03-11 DIAGNOSIS — Z88.8: ICD-10-CM

## 2021-03-11 DIAGNOSIS — Z88.0: ICD-10-CM

## 2021-03-11 DIAGNOSIS — K57.92: Primary | ICD-10-CM

## 2021-03-11 PROCEDURE — 74176 CT ABD & PELVIS W/O CONTRAST: CPT

## 2021-03-11 NOTE — EMERGENCY DEPARTMENT REPORT
ED General Adult HPI





- General


Stated complaint: DIARRHEA


Time Seen by Provider: 21 08:07





- History of Present Illness


Initial comments: 





Patient is a 80-year-old male presents emergency department for evaluation of 

loose watery stool x4 days since taking a home remedy for constipation ("[place] 

Екатерина").  Patient states he did have normal bowel movement but was followed by 

consistent episodes of loose stool.  Patient denies abdominal pain, denies n

ausea vomiting, denies fever, denies dysuria, denies chest pain, denies 

weakness, denies shortness of breath.  Patient states he called home nurse who 

recommended ER evaluation.





- Related Data


                                Home Medications











 Medication  Instructions  Recorded  Confirmed  Last Taken


 


Insulin Lispro Protamin/Lispro 0 unit SQ TID 19





[HumaLOG Mix 50-50 VIAL]    








                                  Previous Rx's











 Medication  Instructions  Recorded  Last Taken  Type


 


Amlodipine Besylate [Norvasc] 5 mg PO DAILY #30 tablet 19 Unknown Rx


 


Atorvastatin Calcium [Lipitor] 80 mg PO QHS #30 tablet 19 Unknown Rx


 


Clopidogrel [Plavix] 75 mg PO QDAY #30 tablet 19 Unknown Rx


 


Insulin Lispro Protamin/Lispro 25 units SQ TID 30 Days  vial 19 Unknown Rx





[HumaLOG Mix 50-50 VIAL]    


 


Levothyroxine Sodium [Synthroid] 175 mcg PO QDAY #30 tablet 19 Unknown Rx


 


Metoprolol [Lopressor TAB] 37.5 mg PO BID #60 tablet 19 Unknown Rx


 


hydroCHLOROthiazide [HCTZ] 12.5 mg PO QDAY #30 capsule 19 Unknown Rx


 


Polyethylene Glycol/Elect 4,000 ml PO ONCE PRN #1 bottle 20 Unknown Rx





[Golytely]    


 


levoFLOXacin [Levaquin TAB] 750 mg PO QDAY 9 Days #9 tablet 21 Unknown Rx


 


metroNIDAZOLE [Flagyl TAB] 500 mg PO Q8HR 10 Days #29 tablet 21 Unknown Rx











                                    Allergies











Allergy/AdvReac Type Severity Reaction Status Date / Time


 


codeine Allergy  Itching Verified 18 14:14


 


Iodinated Contrast Media Allergy  Anaphylaxis Verified 18 07:32





[Iodinated Contrast- Oral     





and IV Dye]     


 


penicillin Allergy  Rash Verified 18 14:14


 


aspirin AdvReac  Unknown Verified 18 14:14


 


lisinopril AdvReac  Angioedema Verified 19 13:06














ED Review of Systems


ROS: 


Stated complaint: DIARRHEA


Other details as noted in HPI





Constitutional: denies: chills, fever


Eyes: denies: eye pain, eye discharge, vision change


ENT: denies: ear pain, throat pain


Respiratory: denies: cough, shortness of breath, wheezing


Cardiovascular: denies: chest pain, palpitations


Endocrine: no symptoms reported


Gastrointestinal: denies: abdominal pain, nausea, diarrhea


Genitourinary: denies: urgency, dysuria


Musculoskeletal: denies: back pain, joint swelling, arthralgia


Skin: denies: rash, lesions


Neurological: denies: headache, weakness, paresthesias


Psychiatric: denies: anxiety, depression


Hematological/Lymphatic: denies: easy bleeding, easy bruising





ED Past Medical Hx





- Past Medical History


Hx Hypertension: Yes


Hx CVA: Yes (L sided deficits)


Hx Diabetes: Yes


Hx Asthma: Yes


Hx HIV: No


Additional medical history: Graves dx.  gastric ulcers, Constipation





- Surgical History


Additional Surgical History: knee OR bilat.  thyroid OR.  robotic heart surgery 

10 days ago





- Social History


Smoking Status: Never Smoker


Substance Use Type: None





- Medications


Home Medications: 


                                Home Medications











 Medication  Instructions  Recorded  Confirmed  Last Taken  Type


 


Insulin Lispro Protamin/Lispro 0 unit SQ TID 19 History





[HumaLOG Mix 50-50 VIAL]     


 


Amlodipine Besylate [Norvasc] 5 mg PO DAILY #30 tablet 19  Unknown Rx


 


Atorvastatin Calcium [Lipitor] 80 mg PO QHS #30 tablet 19  Unknown Rx


 


Clopidogrel [Plavix] 75 mg PO QDAY #30 tablet 19  Unknown Rx


 


Insulin Lispro Protamin/Lispro 25 units SQ TID 30 Days  vial 19  Unknown 

Rx





[HumaLOG Mix 50-50 VIAL]     


 


Levothyroxine Sodium [Synthroid] 175 mcg PO QDAY #30 tablet 19  Unknown Rx


 


Metoprolol [Lopressor TAB] 37.5 mg PO BID #60 tablet 19  Unknown Rx


 


hydroCHLOROthiazide [HCTZ] 12.5 mg PO QDAY #30 capsule 19  Unknown Rx


 


Polyethylene Glycol/Elect 4,000 ml PO ONCE PRN #1 bottle 20  Unknown Rx





[Golytely]     


 


levoFLOXacin [Levaquin TAB] 750 mg PO QDAY 9 Days #9 tablet 21  Unknown Rx


 


metroNIDAZOLE [Flagyl TAB] 500 mg PO Q8HR 10 Days #29 tablet 21  Unknown 

Rx














ED Physical Exam





- General


General appearance: alert, in no apparent distress





- Head


Head exam: Present: atraumatic, normocephalic





- Eye


Eye exam: Present: normal appearance





- ENT


ENT exam: Present: mucous membranes moist





- Neck


Neck exam: Present: normal inspection





- Respiratory


Respiratory exam: Present: normal lung sounds bilaterally.  Absent: respiratory 

distress





- Cardiovascular


Cardiovascular Exam: Present: regular rate, normal rhythm





- GI/Abdominal


GI/Abdominal exam: Present: soft, normal bowel sounds





- Rectal


Rectal exam: Present: deferred





- Extremities Exam


Extremities exam: Present: normal inspection





- Back Exam


Back exam: Present: normal inspection





- Neurological Exam


Neurological exam: Present: alert, oriented X3





- Psychiatric


Psychiatric exam: Present: normal affect, normal mood





- Skin


Skin exam: Present: warm, dry, intact, normal color.  Absent: rash





ED Course


                                   Vital Signs











  21





  08:30 08:36 11:17


 


Temperature 98.7 F 98.7 F 


 


Pulse Rate 94 H 94 H 96 H


 


Respiratory 17 17 18





Rate   


 


Blood Pressure 156/84  


 


Blood Pressure  156/84 157/79





[Left]   


 


O2 Sat by Pulse 98 98 97





Oximetry   














- Reevaluation(s)


Reevaluation #1: 





21 13:13


Patient treated with levaquin, flagyl, and lomotil PO. On discharge patient in 

NAD. ABD soft, nontender. 


21 13:15








ED Medical Decision Making





- Lab Data








                                   Vital Signs











  21





  08:30 08:36 11:17


 


Temperature 98.7 F 98.7 F 


 


Pulse Rate 94 H 94 H 96 H


 


Respiratory 17 17 18





Rate   


 


Blood Pressure 156/84  


 


Blood Pressure  156/84 157/79





[Left]   


 


O2 Sat by Pulse 98 98 97





Oximetry   














- Radiology Data


Radiology results: report reviewed





<HTML><META HTTP-EQUIV="content-type" CONTENT="text/html;charset=utf-8">


[facility]  


                                     [address] [place] [address]  


 


                                          Cat Scan Report   


                                               Signed  


 


Patient: OBDULIO PUENTE                                                   

             MR#: [id number]  


[id number]          


: 1940                                                                

Acct:[id number]      


 


Age/Sex: 80 / M                                                                

ADM Date: 21     


 


Loc: ED       


Attending Dr:   


 


 


Ordering Physician: JIMY ARCEO MD  


Date of Service: 21  


Procedure(s): CT abdomen pelvis wo con  


Accession Number(s): [id number]  


 


cc: JIMY ARCEO MD   


 


 


CT ABDOMEN AND PELVIS WITHOUT CONTRAST  


 


 INDICATION / CLINICAL INFORMATION:  


 diverticulitis.  


 


 TECHNIQUE:  


 Axial CT images were obtained through the abdomen and pelvis without IV 

contrast.  All CT scans at 


this location are performed using CT dose reduction for ALARA by means of 

automated exposure 


control.   


 


 COMPARISON:  


 2018  


 


 FINDINGS:  


 


 LOWER CHEST: Atelectasis noted in the lingula. Moderate coronary artery 

calcifications.  


 LIVER: No significant abnormality.  


 GALLBLADDER: No significant abnormality.    


 BILE DUCTS: No significant abnormality.  


 PANCREAS: No significant abnormality.  


 SPLEEN: No significant abnormality.  


 ADRENALS: No significant abnormality.  


 RIGHT KIDNEY / URETER: No significant abnormality.  


 LEFT KIDNEY / URETER: 2.3 cm parapelvic cyst on the left.  


 


 STOMACH / SMALL BOWEL: Large hiatal hernia.   


 COLON: Moderate sized left inguinal hernia containing fat and sigmoid colon. 

Sigmoid colon 


demonstrates diverticulosis with mild concentric mural thickening and 

pericolonic fat stranding of 


the segment exiting the hernia sac. No significant fluid or inflammation is 

noted within the hernia 


sac. No evidence of perforation or pericolonic abscess. Abundant fecal material 

noted at the rectal 


vault.  


 APPENDIX: No significant abnormality.    


 PERITONEUM: No free fluid. No free air. No fluid collection.  


 LYMPH NODES: No significant adenopathy.  


 AORTA / ARTERIES: Mild atherosclerotic calcification without acute abnormality.

   


 IVC / VEINS: No significant abnormality.  


 


 URINARY BLADDER: Small right inguinal hernia contains fat and a small portion 

of the dome of the 


bladder.   


 REPRODUCTIVE ORGANS: No significant abnormality.  


 


 ADDITIONAL FINDINGS: None.  


 


 SKELETAL SYSTEM: Diffuse osteopenia. Moderate/severe spondylosis throughout the

 visualized spine. 


No aggressive osseous lesions. Advanced bilateral hip arthrosis.  


 


 IMPRESSION:  


 1. Mild sigmoid diverticulitis involving the sigmoid segment just outside of 

and distal to the left


inguinal hernia sac.  


 2. Left inguinal hernia containing sigmoid colon and fat. No significant 

inflammatory changes or 


fluid are noted within the hernia sac.  


 3. Right inguinal hernia contains fat and small portion of the dome of the 

bladder.  


 4. Large hiatal hernia.  


 


 Signer Name: Pk Schmidt MD   


 Signed: 3/11/2021 9:47 AM  


 Workstation Name: [id number]   


 


 


Transcribed By:   


Dictated By: PK SCHMIDT  


Electronically Authenticated By: PK SCHMIDT    


Signed Date/Time: 21 5764                                


 


Critical care attestation.: 


If time is entered above; I have spent that time in minutes in the direct care 

of this critically ill patient, excluding procedure time.








ED Disposition


Clinical Impression: 


 Diverticulitis





Disposition: DC-01 TO HOME OR SELFCARE


Is pt being admited?: No


Condition: Stable


Instructions:  Diverticulitis


Prescriptions: 


metroNIDAZOLE [Flagyl TAB] 500 mg PO Q8HR 10 Days #29 tablet


levoFLOXacin [Levaquin TAB] 750 mg PO QDAY 9 Days #9 tablet


Referrals: 


PRIMARY CARE,MD [Primary Care Provider] - 3-5 Days

## 2021-03-11 NOTE — CAT SCAN REPORT
CT ABDOMEN AND PELVIS WITHOUT CONTRAST



INDICATION / CLINICAL INFORMATION:

diverticulitis.



TECHNIQUE:

Axial CT images were obtained through the abdomen and pelvis without IV contrast.  All CT scans at [facility] location are performed using CT dose reduction for ALARA by means of automated exposure control. 



COMPARISON:

11/6/2018



FINDINGS:



LOWER CHEST: Atelectasis noted in the lingula. Moderate coronary artery calcifications.

LIVER: No significant abnormality.

GALLBLADDER: No significant abnormality.  

BILE DUCTS: No significant abnormality.

PANCREAS: No significant abnormality.

SPLEEN: No significant abnormality.

ADRENALS: No significant abnormality.

RIGHT KIDNEY / URETER: No significant abnormality.

LEFT KIDNEY / URETER: 2.3 cm parapelvic cyst on the left.



STOMACH / SMALL BOWEL: Large hiatal hernia. 

COLON: Moderate sized left inguinal hernia containing fat and sigmoid colon. Sigmoid colon demonstrat
es diverticulosis with mild concentric mural thickening and pericolonic fat stranding of the segment 
exiting the hernia sac. No significant fluid or inflammation is noted within the hernia sac. No evide
nce of perforation or pericolonic abscess. Abundant fecal material noted at the rectal vault.

APPENDIX: No significant abnormality.  

PERITONEUM: No free fluid. No free air. No fluid collection.

LYMPH NODES: No significant adenopathy.

AORTA / ARTERIES: Mild atherosclerotic calcification without acute abnormality. 

IVC / VEINS: No significant abnormality.



URINARY BLADDER: Small right inguinal hernia contains fat and a small portion of the dome of the blad
tracie. 

REPRODUCTIVE ORGANS: No significant abnormality.



ADDITIONAL FINDINGS: None.



SKELETAL SYSTEM: Diffuse osteopenia. Moderate/severe spondylosis throughout the visualized spine. No 
aggressive osseous lesions. Advanced bilateral hip arthrosis.



IMPRESSION:

1. Mild sigmoid diverticulitis involving the sigmoid segment just outside of and distal to the left i
nguinal hernia sac.

2. Left inguinal hernia containing sigmoid colon and fat. No significant inflammatory changes or flui
d are noted within the hernia sac.

3. Right inguinal hernia contains fat and small portion of the dome of the bladder.

4. Large hiatal hernia.



Signer Name: Pk Byers MD 

Signed: 3/11/2021 9:47 AM

Workstation Name: [id number]

## 2021-12-02 ENCOUNTER — HOSPITAL ENCOUNTER (EMERGENCY)
Dept: HOSPITAL 5 - ED | Age: 81
Discharge: HOME | End: 2021-12-02
Payer: MEDICARE

## 2021-12-02 VITALS — SYSTOLIC BLOOD PRESSURE: 107 MMHG | DIASTOLIC BLOOD PRESSURE: 76 MMHG

## 2021-12-02 DIAGNOSIS — N30.00: Primary | ICD-10-CM

## 2021-12-02 DIAGNOSIS — Z88.0: ICD-10-CM

## 2021-12-02 DIAGNOSIS — E11.8: ICD-10-CM

## 2021-12-02 DIAGNOSIS — I10: ICD-10-CM

## 2021-12-02 DIAGNOSIS — Z91.041: ICD-10-CM

## 2021-12-02 DIAGNOSIS — Z86.73: ICD-10-CM

## 2021-12-02 DIAGNOSIS — I25.10: ICD-10-CM

## 2021-12-02 DIAGNOSIS — Z98.890: ICD-10-CM

## 2021-12-02 DIAGNOSIS — Z88.5: ICD-10-CM

## 2021-12-02 DIAGNOSIS — K40.90: ICD-10-CM

## 2021-12-02 DIAGNOSIS — J45.909: ICD-10-CM

## 2021-12-02 DIAGNOSIS — Z88.8: ICD-10-CM

## 2021-12-02 DIAGNOSIS — E05.00: ICD-10-CM

## 2021-12-02 LAB
ALBUMIN SERPL-MCNC: 3.5 G/DL (ref 3.9–5)
ALT SERPL-CCNC: 7 UNITS/L (ref 7–56)
BASOPHILS # (AUTO): 0.1 K/MM3 (ref 0–0.1)
BASOPHILS NFR BLD AUTO: 0.8 % (ref 0–1.8)
BILIRUB DIRECT SERPL-MCNC: 0.3 MG/DL (ref 0–0.2)
BILIRUB UR QL STRIP: (no result)
BLOOD UR QL VISUAL: (no result)
BUN SERPL-MCNC: 25 MG/DL (ref 9–20)
BUN/CREAT SERPL: 25 %
CALCIUM SERPL-MCNC: 9.1 MG/DL (ref 8.4–10.2)
EOSINOPHIL # BLD AUTO: 0.1 K/MM3 (ref 0–0.4)
EOSINOPHIL NFR BLD AUTO: 1.9 % (ref 0–4.3)
HCT VFR BLD CALC: 42.8 % (ref 35.5–45.6)
HEMOLYSIS INDEX: 6
HGB BLD-MCNC: 14.1 GM/DL (ref 11.8–15.2)
INR PPP: 1.29 (ref 0.87–1.13)
LYMPHOCYTES # BLD AUTO: 1.5 K/MM3 (ref 1.2–5.4)
LYMPHOCYTES NFR BLD AUTO: 21.4 % (ref 13.4–35)
MCHC RBC AUTO-ENTMCNC: 33 % (ref 32–34)
MCV RBC AUTO: 87 FL (ref 84–94)
MONOCYTES # (AUTO): 0.7 K/MM3 (ref 0–0.8)
MONOCYTES % (AUTO): 9.7 % (ref 0–7.3)
PH UR STRIP: 7 [PH] (ref 5–7)
PLATELET # BLD: 357 K/MM3 (ref 140–440)
PROT UR STRIP-MCNC: (no result) MG/DL
RBC # BLD AUTO: 4.9 M/MM3 (ref 3.65–5.03)
RBC #/AREA URNS HPF: < 1 /HPF (ref 0–6)
UROBILINOGEN UR-MCNC: < 2 MG/DL (ref ?–2)
WBC #/AREA URNS HPF: > 182 /HPF (ref 0–6)

## 2021-12-02 PROCEDURE — 86900 BLOOD TYPING SEROLOGIC ABO: CPT

## 2021-12-02 PROCEDURE — 85025 COMPLETE CBC W/AUTO DIFF WBC: CPT

## 2021-12-02 PROCEDURE — 36415 COLL VENOUS BLD VENIPUNCTURE: CPT

## 2021-12-02 PROCEDURE — 99284 EMERGENCY DEPT VISIT MOD MDM: CPT

## 2021-12-02 PROCEDURE — 81001 URINALYSIS AUTO W/SCOPE: CPT

## 2021-12-02 PROCEDURE — 83690 ASSAY OF LIPASE: CPT

## 2021-12-02 PROCEDURE — 80076 HEPATIC FUNCTION PANEL: CPT

## 2021-12-02 PROCEDURE — 85610 PROTHROMBIN TIME: CPT

## 2021-12-02 PROCEDURE — 82150 ASSAY OF AMYLASE: CPT

## 2021-12-02 PROCEDURE — 86850 RBC ANTIBODY SCREEN: CPT

## 2021-12-02 PROCEDURE — 86901 BLOOD TYPING SEROLOGIC RH(D): CPT

## 2021-12-02 PROCEDURE — 74176 CT ABD & PELVIS W/O CONTRAST: CPT

## 2021-12-02 PROCEDURE — 80048 BASIC METABOLIC PNL TOTAL CA: CPT

## 2021-12-02 NOTE — CAT SCAN REPORT
CT ABDOMEN AND PELVIS WITHOUT CONTRAST



INDICATION / CLINICAL INFORMATION:

Abdominal Pain.



TECHNIQUE:

Axial CT images were obtained through the abdomen and pelvis without IV contrast.  All CT scans at th
is location are performed using CT dose reduction for ALARA by means of automated exposure control. 



COMPARISON:

CT abdomen and pelvis without contrast from 3/11/2021.



FINDINGS:



LOWER CHEST: Unchanged, uncomplicated moderate hiatal hernia. No other significant abnormalities.

LIVER: No significant abnormality.

BILIARY: The gallbladder is unremarkable. No biliary ductal dilatation. 

PANCREAS: No significant abnormality.

SPLEEN: No significant abnormality.

ADRENALS: No significant abnormality.

KIDNEYS / URETERS: Unchanged left lower renal pole simple cyst measuring 2.4 cm. No other significant
 abnormalities.



GI TRACT: No other significant abnormality of the stomach. No significant abnormality of the small ricky
wel. Sigmoid diverticulosis is noted with mild thickening and surrounding fat stranding seen along th
e proximal third of the sigmoid colon. Portions of the sigmoid colon are again seen within an unchang
ed moderate left inguinal hernia. Fat is also again seen within the hernia without acute inflammatory
 changes. No other significant abnormality of the colon. The appendix is not visualized.  

PERITONEUM: No free fluid. No free air. No fluid collection.

LYMPH NODES: No significant adenopathy.

VASCULATURE: Unchanged moderate atherosclerosis. No other significant abnormality. 



URINARY BLADDER: A small right inguinal hernia is again seen involving a small portion of the dome of
 the bladder without associated acute findings. No other significant abnormality of the bladder.

REPRODUCTIVE ORGANS: No significant abnormality.



ADDITIONAL FINDINGS: None.



BONES: No acute findings or other significant interval changes.



IMPRESSION:

1. Uncomplicated mild acute sigmoid diverticulitis.

2. No other significant interval changes.



Signer Name: Calvin Boothe MD 

Signed: 12/2/2021 1:13 PM

Workstation Name: [id number]

## 2021-12-02 NOTE — EMERGENCY DEPARTMENT REPORT
ED Abdominal Pain HPI





- General


Chief Complaint: Abdominal Pain


Stated Complaint: ABDOMINAL PAIN


Time Seen by Provider: 12/02/21 12:07


Source: patient, EMS


Mode of arrival: Stretcher


Limitations: No Limitations





- History of Present Illness


Initial Comments: 





80-year-old male with known history of coronary artery disease, hypertension, 

diabetes mellitus, recent CVA x3 with residual left-sided weakness presents to 

the emergency room today complaining of lower abdominal pain.  States it is a 

pressure-like sensation has been present for the past 2 to 3 days.  Patient 

recently was discharged from the hospital with episodes of dizziness which was 

caused by pulmonary embolus.  Patient started on anticoagulation.  Patient 

believes that the suprapubic discomfort is secondary to the hernia that he has 

had.  Patient has a left-sided inguinal hernia.  States he does reduce when he 

lays flat but he states that it is a pressure sensation in this area now.  

Denies dysuria.  Does have some mild constipation.  Denies nausea vomiting 

fevers or chills.


Severity scale (0 -10): 5





- Related Data


                                Home Medications











 Medication  Instructions  Recorded  Confirmed  Last Taken


 


Atorvastatin [Lipitor] 80 mg PO QAM 11/15/21 12/02/21 11/11/21


 


Colostrum, Bovine [Colostrum] 500 mg PO QDAY 11/15/21 12/02/21 11/11/21


 


Ezetimibe [Zetia] 10 mg PO QDAY 11/15/21 12/02/21 11/11/21


 


Fenofibrate [Tricor] 145 mg PO QDAY 11/15/21 12/02/21 11/11/21


 


Metoprolol [Lopressor TAB] 50 mg PO QDAY 11/15/21 12/02/21 11/11/21


 


Multivit-Min/FA/Lycopen/Lutein 1 each PO QDAY 11/15/21 12/02/21 11/11/21





[Centrum Silver Tablet]    


 


Omeprazole 40 mg PO BID 11/15/21 12/02/21 11/11/21


 


hydrALAZINE [Apresoline TAB] 25 mg PO QDAY 11/15/21 12/02/21 11/11/21








                                  Previous Rx's











 Medication  Instructions  Recorded  Last Taken  Type


 


Amlodipine Besylate [Norvasc] 5 mg PO DAILY #30 tablet 06/30/19 11/11/21 Rx


 


Clopidogrel [Plavix] 75 mg PO QDAY #30 tablet 06/30/19 11/11/21 Rx


 


Levothyroxine Sodium [Synthroid] 175 mcg PO QDAY #30 tablet 06/30/19 11/11/21 Rx


 


Apixaban [Eliquis] 0 mg PO .AS DIR #88 tablet 11/17/21 Unknown Rx


 


Meclizine [Antivert] 12.5 mg PO Q8H PRN #30 tablet 11/17/21 Unknown Rx


 


Ciprofloxacin HCl 500 mg PO BID #20 tablet 12/02/21 Unknown Rx


 


Phenazopyridine [Pyridium] 200 mg PO BID #6 tab 12/02/21 Unknown Rx











                                    Allergies











Allergy/AdvReac Type Severity Reaction Status Date / Time


 


codeine Allergy  Itching, Verified 11/15/21 15:05





   fainting  


 


Iodinated Contrast Media Allergy  Anaphylaxis Verified 11/15/21 15:05





[Iodinated Contrast- Oral     





and IV Dye]     


 


penicillin Allergy  Rash, Verified 11/15/21 15:05





   itching,  





   fainting  


 


aspirin AdvReac  history of Verified 11/15/21 15:05





   stomach  





   ulcer  


 


lisinopril AdvReac  Angioedema Verified 11/15/21 15:05














ED Review of Systems


ROS: 


Stated complaint: ABDOMINAL PAIN


Other details as noted in HPI





Comment: All other systems reviewed and negative





ED Past Medical Hx





- Past Medical History


Hx Hypertension: Yes


Hx CVA: Yes (L sided deficits)


Hx Diabetes: Yes


Hx Asthma: Yes


Hx HIV: No


Additional medical history: CAD, Graves dx.  gastric ulcers, Constipation





- Surgical History


Additional Surgical History: knee OR bilat.  thyroid OR.  robotic heart surgery 

10 days ago





- Social History


Smoking Status: Unknown if ever smoked





- Medications


Home Medications: 


                                Home Medications











 Medication  Instructions  Recorded  Confirmed  Last Taken  Type


 


Amlodipine Besylate [Norvasc] 5 mg PO DAILY #30 tablet 06/30/19 12/02/21 11/11/21 Rx


 


Clopidogrel [Plavix] 75 mg PO QDAY #30 tablet 06/30/19 12/02/21 11/11/21 Rx


 


Levothyroxine Sodium [Synthroid] 175 mcg PO QDAY #30 tablet 06/30/19 12/02/21 1

1/11/21 Rx


 


Atorvastatin [Lipitor] 80 mg PO QAM 11/15/21 12/02/21 11/11/21 History


 


Colostrum, Bovine [Colostrum] 500 mg PO QDAY 11/15/21 12/02/21 11/11/21 History


 


Ezetimibe [Zetia] 10 mg PO QDAY 11/15/21 12/02/21 11/11/21 History


 


Fenofibrate [Tricor] 145 mg PO QDAY 11/15/21 12/02/21 11/11/21 History


 


Metoprolol [Lopressor TAB] 50 mg PO QDAY 11/15/21 12/02/21 11/11/21 History


 


Multivit-Min/FA/Lycopen/Lutein 1 each PO QDAY 11/15/21 12/02/21 11/11/21 History





[Centrum Silver Tablet]     


 


Omeprazole 40 mg PO BID 11/15/21 12/02/21 11/11/21 History


 


hydrALAZINE [Apresoline TAB] 25 mg PO QDAY 11/15/21 12/02/21 11/11/21 History


 


Apixaban [Eliquis] 0 mg PO .AS DIR #88 tablet 11/17/21 12/02/21 Unknown Rx


 


Meclizine [Antivert] 12.5 mg PO Q8H PRN #30 tablet 11/17/21 12/02/21 Unknown Rx


 


Ciprofloxacin HCl 500 mg PO BID #20 tablet 12/02/21  Unknown Rx


 


Phenazopyridine [Pyridium] 200 mg PO BID #6 tab 12/02/21  Unknown Rx














ED Physical Exam





- General


Limitations: No Limitations


General appearance: alert, in no apparent distress





- Head


Head exam: Present: atraumatic, normocephalic





- Eye


Eye exam: Present: normal appearance





- ENT


ENT exam: Present: mucous membranes moist





- Neck


Neck exam: Present: normal inspection





- Respiratory


Respiratory exam: Present: normal lung sounds bilaterally.  Absent: respiratory 

distress, wheezes, rales





- Cardiovascular


Cardiovascular Exam: Present: regular rate, normal rhythm.  Absent: systolic 

murmur, diastolic murmur, rubs, gallop





- GI/Abdominal


GI/Abdominal exam: Present: soft, tenderness (Mild suprapubic discomfort), 

normal bowel sounds, hernia (Reducible left-sided inguinal hernia).  Absent: 

distended, guarding, rebound, rigid





- Rectal


Rectal exam: Present: deferred





- Extremities Exam


Extremities exam: Present: normal inspection





- Back Exam


Back exam: Present: normal inspection





- Neurological Exam


Neurological exam: Present: alert, oriented X3





- Psychiatric


Psychiatric exam: Present: normal affect, normal mood





- Skin


Skin exam: Present: warm, dry, intact, normal color.  Absent: rash





ED Course


                                   Vital Signs











  12/02/21 12/02/21 12/02/21





  11:13 11:28 11:29


 


Temperature 98.6 F  


 


Pulse Rate 98 H  


 


Respiratory 18  





Rate   


 


Blood Pressure   


 


Blood Pressure 152/81  





[Right]   


 


O2 Sat by Pulse 98 100 98





Oximetry   














  12/02/21





  11:30


 


Temperature 


 


Pulse Rate 


 


Respiratory 





Rate 


 


Blood Pressure 145/82


 


Blood Pressure 





[Right] 


 


O2 Sat by Pulse 100





Oximetry 














ED Medical Decision Making





- Lab Data


Result diagrams: 


                                 12/02/21 11:44





                                 12/02/21 11:44








                                   Lab Results











  12/02/21 12/02/21 12/02/21 Range/Units





  11:44 11:44 11:44 


 


WBC  7.2    (4.5-11.0)  K/mm3


 


RBC  4.90    (3.65-5.03)  M/mm3


 


Hgb  14.1    (11.8-15.2)  gm/dl


 


Hct  42.8    (35.5-45.6)  %


 


MCV  87    (84-94)  fl


 


MCH  29    (28-32)  pg


 


MCHC  33    (32-34)  %


 


RDW  14.7    (13.2-15.2)  %


 


Plt Count  357    (140-440)  K/mm3


 


Lymph % (Auto)  21.4    (13.4-35.0)  %


 


Mono % (Auto)  9.7 H    (0.0-7.3)  %


 


Eos % (Auto)  1.9    (0.0-4.3)  %


 


Baso % (Auto)  0.8    (0.0-1.8)  %


 


Lymph # (Auto)  1.5    (1.2-5.4)  K/mm3


 


Mono # (Auto)  0.7    (0.0-0.8)  K/mm3


 


Eos # (Auto)  0.1    (0.0-0.4)  K/mm3


 


Baso # (Auto)  0.1    (0.0-0.1)  K/mm3


 


Seg Neutrophils %  66.2    (40.0-70.0)  %


 


Seg Neutrophils #  4.8    (1.8-7.7)  K/mm3


 


PT    17.4 H  (12.2-14.9)  Sec.


 


INR    1.29 H  (0.87-1.13)  


 


Sodium   140   (137-145)  mmol/L


 


Potassium   3.7   (3.6-5.0)  mmol/L


 


Chloride   102.2   ()  mmol/L


 


Carbon Dioxide   22   (22-30)  mmol/L


 


Anion Gap   20   mmol/L


 


BUN   25 H   (9-20)  mg/dL


 


Creatinine   1.0   (0.8-1.3)  mg/dL


 


Estimated GFR   > 60   ml/min


 


BUN/Creatinine Ratio   25   %


 


Glucose   146 H   ()  mg/dL


 


Calcium   9.1   (8.4-10.2)  mg/dL


 


Total Bilirubin     (0.1-1.2)  mg/dL


 


Direct Bilirubin     (0-0.2)  mg/dL


 


Indirect Bilirubin     mg/dL


 


AST     (5-40)  units/L


 


ALT     (7-56)  units/L


 


Alkaline Phosphatase     ()  units/L


 


Total Protein     (6.3-8.2)  g/dL


 


Albumin     (3.9-5)  g/dL


 


Albumin/Globulin Ratio     %


 


Amylase     ()  units/L


 


Lipase     (13-60)  units/L


 


Urine Color     (Yellow)  


 


Urine Turbidity     (Clear)  


 


Urine pH     (5.0-7.0)  


 


Ur Specific Gravity     (1.003-1.030)  


 


Urine Protein     (Negative)  mg/dL


 


Urine Glucose (UA)     (Negative)  mg/dL


 


Urine Ketones     (Negative)  mg/dL


 


Urine Blood     (Negative)  


 


Urine Nitrite     (Negative)  


 


Urine Bilirubin     (Negative)  


 


Urine Urobilinogen     (<2.0)  mg/dL


 


Ur Leukocyte Esterase     (Negative)  


 


Urine WBC (Auto)     (0.0-6.0)  /HPF


 


Urine RBC (Auto)     (0.0-6.0)  /HPF


 


U Epithel Cells (Auto)     (0-13.0)  /HPF


 


Urine WBC Clumps     /HPF














  12/02/21 12/02/21 Range/Units





  11:44 12:36 


 


WBC    (4.5-11.0)  K/mm3


 


RBC    (3.65-5.03)  M/mm3


 


Hgb    (11.8-15.2)  gm/dl


 


Hct    (35.5-45.6)  %


 


MCV    (84-94)  fl


 


MCH    (28-32)  pg


 


MCHC    (32-34)  %


 


RDW    (13.2-15.2)  %


 


Plt Count    (140-440)  K/mm3


 


Lymph % (Auto)    (13.4-35.0)  %


 


Mono % (Auto)    (0.0-7.3)  %


 


Eos % (Auto)    (0.0-4.3)  %


 


Baso % (Auto)    (0.0-1.8)  %


 


Lymph # (Auto)    (1.2-5.4)  K/mm3


 


Mono # (Auto)    (0.0-0.8)  K/mm3


 


Eos # (Auto)    (0.0-0.4)  K/mm3


 


Baso # (Auto)    (0.0-0.1)  K/mm3


 


Seg Neutrophils %    (40.0-70.0)  %


 


Seg Neutrophils #    (1.8-7.7)  K/mm3


 


PT    (12.2-14.9)  Sec.


 


INR    (0.87-1.13)  


 


Sodium    (137-145)  mmol/L


 


Potassium    (3.6-5.0)  mmol/L


 


Chloride    ()  mmol/L


 


Carbon Dioxide    (22-30)  mmol/L


 


Anion Gap    mmol/L


 


BUN    (9-20)  mg/dL


 


Creatinine    (0.8-1.3)  mg/dL


 


Estimated GFR    ml/min


 


BUN/Creatinine Ratio    %


 


Glucose    ()  mg/dL


 


Calcium    (8.4-10.2)  mg/dL


 


Total Bilirubin  0.80   (0.1-1.2)  mg/dL


 


Direct Bilirubin  0.3 H   (0-0.2)  mg/dL


 


Indirect Bilirubin  0.5   mg/dL


 


AST  13   (5-40)  units/L


 


ALT  7   (7-56)  units/L


 


Alkaline Phosphatase  46   ()  units/L


 


Total Protein  6.9   (6.3-8.2)  g/dL


 


Albumin  3.5 L   (3.9-5)  g/dL


 


Albumin/Globulin Ratio  1.0   %


 


Amylase  30   ()  units/L


 


Lipase  23   (13-60)  units/L


 


Urine Color   Yellow  (Yellow)  


 


Urine Turbidity   Turbid  (Clear)  


 


Urine pH   7.0  (5.0-7.0)  


 


Ur Specific Gravity   1.018  (1.003-1.030)  


 


Urine Protein   <15 mg/dl  (Negative)  mg/dL


 


Urine Glucose (UA)   Neg  (Negative)  mg/dL


 


Urine Ketones   Tr  (Negative)  mg/dL


 


Urine Blood   Neg  (Negative)  


 


Urine Nitrite   Pos  (Negative)  


 


Urine Bilirubin   Neg  (Negative)  


 


Urine Urobilinogen   < 2.0  (<2.0)  mg/dL


 


Ur Leukocyte Esterase   Lg  (Negative)  


 


Urine WBC (Auto)   > 182.0 H  (0.0-6.0)  /HPF


 


Urine RBC (Auto)   < 1.0  (0.0-6.0)  /HPF


 


U Epithel Cells (Auto)   2.0  (0-13.0)  /HPF


 


Urine WBC Clumps   3+  /HPF














- Medical Decision Making





Patient is hernia is reducible is flat with him laying down.  Do not believe th

is is the cause of the patient's discomfort.  Urinalysis was positive for UTI.  

Patient started on Cipro and will be discharged home medication for symptomatic 

relief.


Critical care attestation.: 


If time is entered above; I have spent that time in minutes in the direct care 

of this critically ill patient, excluding procedure time.








ED Disposition


Clinical Impression: 


 Acute cystitis, Inguinal hernia





Disposition: 01 HOME / SELF CARE / HOMELESS


Is pt being admited?: No


Does the pt Need Aspirin: No


Condition: Stable


Instructions:  Inguinal Hernia, Adult, Easy-to-Read, Urinary Tract Infection, 

Adult


Referrals: 


PRIMARY CARE,MD [Primary Care Provider] - 3-5 Days


Time of Disposition: 14:14

## 2021-12-31 ENCOUNTER — HOSPITAL ENCOUNTER (EMERGENCY)
Dept: HOSPITAL 5 - ED | Age: 81
Discharge: HOME | End: 2021-12-31
Payer: MEDICARE

## 2021-12-31 VITALS — DIASTOLIC BLOOD PRESSURE: 63 MMHG | SYSTOLIC BLOOD PRESSURE: 125 MMHG

## 2021-12-31 DIAGNOSIS — E11.9: ICD-10-CM

## 2021-12-31 DIAGNOSIS — I10: ICD-10-CM

## 2021-12-31 DIAGNOSIS — Z88.8: ICD-10-CM

## 2021-12-31 DIAGNOSIS — N39.0: ICD-10-CM

## 2021-12-31 DIAGNOSIS — Z79.899: ICD-10-CM

## 2021-12-31 DIAGNOSIS — K59.00: ICD-10-CM

## 2021-12-31 DIAGNOSIS — Z91.041: ICD-10-CM

## 2021-12-31 DIAGNOSIS — Z88.0: ICD-10-CM

## 2021-12-31 DIAGNOSIS — J45.909: ICD-10-CM

## 2021-12-31 DIAGNOSIS — Z86.73: ICD-10-CM

## 2021-12-31 DIAGNOSIS — K40.90: Primary | ICD-10-CM

## 2021-12-31 DIAGNOSIS — Z88.6: ICD-10-CM

## 2021-12-31 LAB
BASOPHILS # (AUTO): 0.1 K/MM3 (ref 0–0.1)
BASOPHILS NFR BLD AUTO: 0.6 % (ref 0–1.8)
BILIRUB UR QL STRIP: (no result)
BLOOD UR QL VISUAL: (no result)
BUN SERPL-MCNC: 17 MG/DL (ref 9–20)
BUN/CREAT SERPL: 21 %
CALCIUM SERPL-MCNC: 9.2 MG/DL (ref 8.4–10.2)
EOSINOPHIL # BLD AUTO: 0.2 K/MM3 (ref 0–0.4)
EOSINOPHIL NFR BLD AUTO: 1.8 % (ref 0–4.3)
HCT VFR BLD CALC: 40.2 % (ref 35.5–45.6)
HEMOLYSIS INDEX: 80
HGB BLD-MCNC: 12.9 GM/DL (ref 11.8–15.2)
LYMPHOCYTES # BLD AUTO: 2.7 K/MM3 (ref 1.2–5.4)
LYMPHOCYTES NFR BLD AUTO: 28.5 % (ref 13.4–35)
MCHC RBC AUTO-ENTMCNC: 32 % (ref 32–34)
MCV RBC AUTO: 87 FL (ref 84–94)
MONOCYTES # (AUTO): 1 K/MM3 (ref 0–0.8)
MONOCYTES % (AUTO): 10.7 % (ref 0–7.3)
MUCOUS THREADS #/AREA URNS HPF: (no result) /HPF
PH UR STRIP: 7 [PH] (ref 5–7)
PLATELET # BLD: 371 K/MM3 (ref 140–440)
RBC # BLD AUTO: 4.63 M/MM3 (ref 3.65–5.03)
RBC #/AREA URNS HPF: 20 /HPF (ref 0–6)
UROBILINOGEN UR-MCNC: < 2 MG/DL (ref ?–2)
WBC #/AREA URNS HPF: 23 /HPF (ref 0–6)

## 2021-12-31 PROCEDURE — 82962 GLUCOSE BLOOD TEST: CPT

## 2021-12-31 PROCEDURE — 74176 CT ABD & PELVIS W/O CONTRAST: CPT

## 2021-12-31 PROCEDURE — 87086 URINE CULTURE/COLONY COUNT: CPT

## 2021-12-31 PROCEDURE — 85025 COMPLETE CBC W/AUTO DIFF WBC: CPT

## 2021-12-31 PROCEDURE — 81001 URINALYSIS AUTO W/SCOPE: CPT

## 2021-12-31 PROCEDURE — 96375 TX/PRO/DX INJ NEW DRUG ADDON: CPT

## 2021-12-31 PROCEDURE — 80048 BASIC METABOLIC PNL TOTAL CA: CPT

## 2021-12-31 PROCEDURE — 96374 THER/PROPH/DIAG INJ IV PUSH: CPT

## 2021-12-31 PROCEDURE — 99285 EMERGENCY DEPT VISIT HI MDM: CPT

## 2021-12-31 PROCEDURE — 36415 COLL VENOUS BLD VENIPUNCTURE: CPT

## 2021-12-31 NOTE — EMERGENCY DEPARTMENT REPORT
ED Abdominal Pain HPI





- General


Chief Complaint: Abdominal Pain


Stated Complaint: HERNIA


Time Seen by Provider: 12/31/21 09:00


Source: patient


Mode of arrival: Stretcher


Limitations: No Limitations





- History of Present Illness


Initial Comments: 





80-year-old male with known history of coronary artery disease, hypertension, 

diabetes mellitus, recent CVA x3 with residual left-sided weakness, and 

pulmonary embolism diagnosed in November currently on Eliquis presents to the 

hospital complaining of recurrent left inguinal hernia.  Patient was seen here 

December 2 for same symptoms.  This document by ED physician that left inguinal 

hernia was reducible while lying supine CT revealed left inguinal hernia 

containing sigmoid colon and fat without inflammation or fluid.  This is similar

to his CT performed on March 11, 2021. patient states hernia reoccurred this 

morning and is painful and does not reduce when lying supine.  Denies nausea, 

vomiting, or fever.  Last bowel movement was yesterday.  During recent ED visit 

he was also treated for UTI and completed course of treatment.  Currently denies

dysuria.  Patient has been taking Imodium due to frequent bowel movement





- Related Data


                                Home Medications











 Medication  Instructions  Recorded  Confirmed  Last Taken


 


Atorvastatin [Lipitor] 80 mg PO QAM 11/15/21 12/02/21 11/11/21


 


Colostrum, Bovine [Colostrum] 500 mg PO QDAY 11/15/21 12/02/21 11/11/21


 


Ezetimibe [Zetia] 10 mg PO QDAY 11/15/21 12/02/21 11/11/21


 


Fenofibrate [Tricor] 145 mg PO QDAY 11/15/21 12/02/21 11/11/21


 


Metoprolol [Lopressor TAB] 50 mg PO QDAY 11/15/21 12/02/21 11/11/21


 


Multivit-Min/FA/Lycopen/Lutein 1 each PO QDAY 11/15/21 12/02/21 11/11/21





[Centrum Silver Tablet]    


 


Omeprazole 40 mg PO BID 11/15/21 12/02/21 11/11/21


 


hydrALAZINE [Apresoline TAB] 25 mg PO QDAY 11/15/21 12/02/21 11/11/21








                                  Previous Rx's











 Medication  Instructions  Recorded  Last Taken  Type


 


Amlodipine Besylate [Norvasc] 5 mg PO DAILY #30 tablet 06/30/19 11/11/21 Rx


 


Clopidogrel [Plavix] 75 mg PO QDAY #30 tablet 06/30/19 11/11/21 Rx


 


Levothyroxine Sodium [Synthroid] 175 mcg PO QDAY #30 tablet 06/30/19 11/11/21 Rx


 


Apixaban [Eliquis] 0 mg PO .AS DIR #88 tablet 11/17/21 Unknown Rx


 


Meclizine [Antivert] 12.5 mg PO Q8H PRN #30 tablet 11/17/21 Unknown Rx


 


Ciprofloxacin HCl 500 mg PO BID #20 tablet 12/02/21 Unknown Rx


 


Phenazopyridine [Pyridium] 200 mg PO BID #6 tab 12/02/21 Unknown Rx


 


Nitrofurantoin Mono/M-Cryst 100 mg PO Q12HR #14 capsule 12/31/21 Unknown Rx





[Macrobid CAP]    











                                    Allergies











Allergy/AdvReac Type Severity Reaction Status Date / Time


 


codeine Allergy  Itching, Verified 11/15/21 15:05





   fainting  


 


Iodinated Contrast Media Allergy  Anaphylaxis Verified 11/15/21 15:05





[Iodinated Contrast- Oral     





and IV Dye]     


 


penicillin Allergy  Rash, Verified 11/15/21 15:05





   itching,  





   fainting  


 


aspirin AdvReac  history of Verified 11/15/21 15:05





   stomach  





   ulcer  


 


lisinopril AdvReac  Angioedema Verified 11/15/21 15:05














ED Review of Systems


ROS: 


Stated complaint: HERNIA


Other details as noted in HPI








ED Past Medical Hx





- Past Medical History


Previous Medical History?: Yes


Hx Hypertension: Yes


Hx CVA: Yes (L sided deficits)


Hx Diabetes: Yes


Hx Pulmonary Embolism: Yes (November 2021)


Hx Asthma: Yes


Hx HIV: No


Additional medical history: CAD, Graves dx.  gastric ulcers, Constipation





- Surgical History


Past Surgical History?: Yes


Additional Surgical History: knee OR bilat.  thyroid OR.  robotic heart surgery 

10 days ago





- Social History


Smoking Status: Never Smoker


Substance Use Type: None





- Medications


Home Medications: 


                                Home Medications











 Medication  Instructions  Recorded  Confirmed  Last Taken  Type


 


Amlodipine Besylate [Norvasc] 5 mg PO DAILY #30 tablet 06/30/19 12/02/21 11/11/21 Rx


 


Clopidogrel [Plavix] 75 mg PO QDAY #30 tablet 06/30/19 12/02/21 11/11/21 Rx


 


Levothyroxine Sodium [Synthroid] 175 mcg PO QDAY #30 tablet 06/30/19 12/02/21 11/11/21 Rx


 


Atorvastatin [Lipitor] 80 mg PO QAM 11/15/21 12/02/21 11/11/21 History


 


Colostrum, Bovine [Colostrum] 500 mg PO QDAY 11/15/21 12/02/21 11/11/21 History


 


Ezetimibe [Zetia] 10 mg PO QDAY 11/15/21 12/02/21 11/11/21 History


 


Fenofibrate [Tricor] 145 mg PO QDAY 11/15/21 12/02/21 11/11/21 History


 


Metoprolol [Lopressor TAB] 50 mg PO QDAY 11/15/21 12/02/21 11/11/21 History


 


Multivit-Min/FA/Lycopen/Lutein 1 each PO QDAY 11/15/21 12/02/21 11/11/21 History





[Centrum Silver Tablet]     


 


Omeprazole 40 mg PO BID 11/15/21 12/02/21 11/11/21 History


 


hydrALAZINE [Apresoline TAB] 25 mg PO QDAY 11/15/21 12/02/21 11/11/21 History


 


Apixaban [Eliquis] 0 mg PO .AS DIR #88 tablet 11/17/21 12/02/21 Unknown Rx


 


Meclizine [Antivert] 12.5 mg PO Q8H PRN #30 tablet 11/17/21 12/02/21 Unknown Rx


 


Ciprofloxacin HCl 500 mg PO BID #20 tablet 12/02/21  Unknown Rx


 


Phenazopyridine [Pyridium] 200 mg PO BID #6 tab 12/02/21  Unknown Rx


 


Nitrofurantoin Mono/M-Cryst 100 mg PO Q12HR #14 capsule 12/31/21  Unknown Rx





[Macrobid CAP]     














ED Physical Exam





- General


Limitations: No Limitations





- Other


Other exam information: 





General: No acute distress


Head: Atraumatic


Eyes: normal appearance


ENT: Moist mucous membranes


Neck: Normal appearance, no midline tenderness


Chest: Clear to auscultation bilaterally


CV: Regular rate and rhythm


Abdomen: Soft, normal bowel sounds, left inguinal hernia noted not reducible to 

palpation


Back: Normal inspection


Extremity: Normal inspection, full range of motion


Neuro: Alert O x 3, no facial asymmetry, speech clear


Psych: Appropriate behavior


Skin: No rash





ED Course


                                   Vital Signs











  12/31/21 12/31/21 12/31/21





  08:43 08:46 11:30


 


Temperature 98.2 F  


 


Temperature [   98 F





Intra-Procedure   





]   


 


Temperature [   





Post-Procedure]   


 


Pulse Rate 76  


 


Pulse Rate [   91 H





Intra-Procedure   





]   


 


Pulse Rate [   





Post-Procedure]   


 


Respiratory 16  





Rate   


 


Respiratory   16





Rate [Intra-   





Procedure]   


 


Respiratory   





Rate [Post-   





Procedure]   


 


Blood Pressure   109/94





[Intra-   





Procedure]   


 


Blood Pressure 145/97  





[Left]   


 


Blood Pressure   





[Post-Procedure   





]   


 


O2 Sat by Pulse 100 96 





Oximetry   


 


O2 Sat by Pulse   





Oximetry [   





Intra-Procedure   





]   














  12/31/21 12/31/21 12/31/21





  11:39 11:54 12:04


 


Temperature   


 


Temperature [   99 F





Intra-Procedure   





]   


 


Temperature [ 98.5 F 98.2 F 





Post-Procedure]   


 


Pulse Rate   


 


Pulse Rate [   91 H





Intra-Procedure   





]   


 


Pulse Rate [ 87 83 





Post-Procedure]   


 


Respiratory   





Rate   


 


Respiratory   16





Rate [Intra-   





Procedure]   


 


Respiratory 16 16 





Rate [Post-   





Procedure]   


 


Blood Pressure   148/74





[Intra-   





Procedure]   


 


Blood Pressure   





[Left]   


 


Blood Pressure 150/79 132/103 





[Post-Procedure   





]   


 


O2 Sat by Pulse   





Oximetry   


 


O2 Sat by Pulse   100





Oximetry [   





Intra-Procedure   





]   














  12/31/21 12/31/21 12/31/21





  12:06 12:26 14:03


 


Temperature   


 


Temperature [   





Intra-Procedure   





]   


 


Temperature [   





Post-Procedure]   


 


Pulse Rate 87 85 86


 


Pulse Rate [   





Intra-Procedure   





]   


 


Pulse Rate [   





Post-Procedure]   


 


Respiratory 16 16 10 L





Rate   


 


Respiratory   





Rate [Intra-   





Procedure]   


 


Respiratory   





Rate [Post-   





Procedure]   


 


Blood Pressure   





[Intra-   





Procedure]   


 


Blood Pressure 148/74 140/76 146/71





[Left]   


 


Blood Pressure   





[Post-Procedure   





]   


 


O2 Sat by Pulse 100 95 99





Oximetry   


 


O2 Sat by Pulse   





Oximetry [   





Intra-Procedure   





]   














- Reevaluation(s)


Reevaluation #1: 





12/31/21 09:18


Initially attempted to reduce left inguinal hernia with patient lying supine 

however, it is too painful.  Patient endorses allergy to codeine and morphine 

which both cause itching without airway symptoms.  Patient is unsure if he has 

had Dilaudid.  Patient will be treated with Benadryl and Dilaudid 0.5 mg and 

observed for reaction and attempt to medicate patient prior to hernia reduction 

attempt


12/31/21 11: 44


Conscious sedation with ketamine was performed.  Pressure and ice pack applied. 

I was able to partially reduce hernia with reduction in size for persistent 

swelling.








12/31/21 14:00


Patient reports improvement in pain and he feels like the hernia is smaller in 

size compared to arrival








- Consultations


Consultation #1: 





12/31/21 





10:22 Case was discussed with Dr. Young who recommends transferring patient if 

hernia is not reducible since we do not have OR capabilities due to lack of 

staffing





I attempted transfer after failed reduction attempt


13:26 [facility] on diversion


14:00 Dr Tanner at [facility] (surgeon) on diversion. Rec Surgeon come to evaluate pt 

since it does not seem like pt requires emergent surgery at this time


14:04 case we discussed with Dr. Young who will come to the ED for evaluation 

but states her recommendation for transfer for transfer will likely remain


14:40 No beds at [place]


14:50 No beds at [facility] also on diversion


14:55 awaiting call back from Dr Byers with surgery at [facility], no beds now but 

possible bed in 10-15 hours


15:15 Dr YOUNG EVALUATED PT AT THE BEDSIDE and since pt feels better and is 

asymptomatic pt may be d/jorge with outpt f/u therefore transfer attempts 

discontinued





        





- Procedure Description


Procedures done: Left inguinal hernia reduction attempt.  Unsuccessful reduction

attempt with IV Dilaudid and bandage.  Moderate sedation then performed with 

more aggressive reduction attempt.  Ice pack used to reduce swelling with 

pressure placed at hernia area and attempts to reduce hernia through defect.  

Hernia partially reduced compared to initial presentation.





- Moderate Sedation


ASA Class: III


Mallampati Airway Score: 2


Preparation: cardiac monitor applied


Ketamine: IV


Ketamine Dose: 91


Complications: none


Interventions: oxygen applied


Patient Tolerated Procedure: well


Additional Comments: 





procedure start time 11:25am with meds and reduction attempt. pt observed and 

alert at 12:04p





ED Medical Decision Making





- Lab Data


Result diagrams: 


                                 12/31/21 09:26





                                 12/31/21 09:26








                                   Lab Results











  12/31/21 12/31/21 12/31/21 Range/Units





  09:26 09:26 10:07 


 


WBC  9.3    (4.5-11.0)  K/mm3


 


RBC  4.63    (3.65-5.03)  M/mm3


 


Hgb  12.9    (11.8-15.2)  gm/dl


 


Hct  40.2    (35.5-45.6)  %


 


MCV  87    (84-94)  fl


 


MCH  28    (28-32)  pg


 


MCHC  32    (32-34)  %


 


RDW  15.5 H    (13.2-15.2)  %


 


Plt Count  371    (140-440)  K/mm3


 


Lymph % (Auto)  28.5    (13.4-35.0)  %


 


Mono % (Auto)  10.7 H    (0.0-7.3)  %


 


Eos % (Auto)  1.8    (0.0-4.3)  %


 


Baso % (Auto)  0.6    (0.0-1.8)  %


 


Lymph # (Auto)  2.7    (1.2-5.4)  K/mm3


 


Mono # (Auto)  1.0 H    (0.0-0.8)  K/mm3


 


Eos # (Auto)  0.2    (0.0-0.4)  K/mm3


 


Baso # (Auto)  0.1    (0.0-0.1)  K/mm3


 


Seg Neutrophils %  58.4    (40.0-70.0)  %


 


Seg Neutrophils #  5.4    (1.8-7.7)  K/mm3


 


Sodium   139   (137-145)  mmol/L


 


Potassium   4.9   (3.6-5.0)  mmol/L


 


Chloride   102.4   ()  mmol/L


 


Carbon Dioxide   23   (22-30)  mmol/L


 


Anion Gap   19   mmol/L


 


BUN   17   (9-20)  mg/dL


 


Creatinine   0.8   (0.8-1.3)  mg/dL


 


Estimated GFR   > 60   ml/min


 


BUN/Creatinine Ratio   21   %


 


Glucose   87   ()  mg/dL


 


POC Glucose     ()  mg/dL


 


Calcium   9.2   (8.4-10.2)  mg/dL


 


Urine Color    Yellow  (Yellow)  


 


Urine Turbidity    Cloudy  (Clear)  


 


Urine pH    7.0  (5.0-7.0)  


 


Ur Specific Gravity    1.014  (1.003-1.030)  


 


Urine Protein    30 mg/dl  (Negative)  mg/dL


 


Urine Glucose (UA)    Neg  (Negative)  mg/dL


 


Urine Ketones    Neg  (Negative)  mg/dL


 


Urine Blood    Neg  (Negative)  


 


Urine Nitrite    Neg  (Negative)  


 


Urine Bilirubin    Neg  (Negative)  


 


Urine Urobilinogen    < 2.0  (<2.0)  mg/dL


 


Ur Leukocyte Esterase    Neg  (Negative)  


 


Urine WBC (Auto)    23.0 H  (0.0-6.0)  /HPF


 


Urine RBC (Auto)    20.0  (0.0-6.0)  /HPF


 


U Epithel Cells (Auto)    < 1.0  (0-13.0)  /HPF


 


Urine WBC Clumps    2+  /HPF


 


Urine Mucus    Few  /HPF


 


Urine Yeast (Budding)    3+  /HPF














  12/31/21 Range/Units





  13:47 


 


WBC   (4.5-11.0)  K/mm3


 


RBC   (3.65-5.03)  M/mm3


 


Hgb   (11.8-15.2)  gm/dl


 


Hct   (35.5-45.6)  %


 


MCV   (84-94)  fl


 


MCH   (28-32)  pg


 


MCHC   (32-34)  %


 


RDW   (13.2-15.2)  %


 


Plt Count   (140-440)  K/mm3


 


Lymph % (Auto)   (13.4-35.0)  %


 


Mono % (Auto)   (0.0-7.3)  %


 


Eos % (Auto)   (0.0-4.3)  %


 


Baso % (Auto)   (0.0-1.8)  %


 


Lymph # (Auto)   (1.2-5.4)  K/mm3


 


Mono # (Auto)   (0.0-0.8)  K/mm3


 


Eos # (Auto)   (0.0-0.4)  K/mm3


 


Baso # (Auto)   (0.0-0.1)  K/mm3


 


Seg Neutrophils %   (40.0-70.0)  %


 


Seg Neutrophils #   (1.8-7.7)  K/mm3


 


Sodium   (137-145)  mmol/L


 


Potassium   (3.6-5.0)  mmol/L


 


Chloride   ()  mmol/L


 


Carbon Dioxide   (22-30)  mmol/L


 


Anion Gap   mmol/L


 


BUN   (9-20)  mg/dL


 


Creatinine   (0.8-1.3)  mg/dL


 


Estimated GFR   ml/min


 


BUN/Creatinine Ratio   %


 


Glucose   ()  mg/dL


 


POC Glucose  74  ()  mg/dL


 


Calcium   (8.4-10.2)  mg/dL


 


Urine Color   (Yellow)  


 


Urine Turbidity   (Clear)  


 


Urine pH   (5.0-7.0)  


 


Ur Specific Gravity   (1.003-1.030)  


 


Urine Protein   (Negative)  mg/dL


 


Urine Glucose (UA)   (Negative)  mg/dL


 


Urine Ketones   (Negative)  mg/dL


 


Urine Blood   (Negative)  


 


Urine Nitrite   (Negative)  


 


Urine Bilirubin   (Negative)  


 


Urine Urobilinogen   (<2.0)  mg/dL


 


Ur Leukocyte Esterase   (Negative)  


 


Urine WBC (Auto)   (0.0-6.0)  /HPF


 


Urine RBC (Auto)   (0.0-6.0)  /HPF


 


U Epithel Cells (Auto)   (0-13.0)  /HPF


 


Urine WBC Clumps   /HPF


 


Urine Mucus   /HPF


 


Urine Yeast (Budding)   /HPF














- Radiology Data


Radiology results: report reviewed





- Medical Decision Making





81-year male with chronic left inguinal hernia presents to the hospital 

worsening swelling and pain.  As per medical record patient has had multiple CT 

scans including March and December 2 showing left inguinal hernia.  Today's 

imaging shows that hernia has increased in size and now has some mild 

inflammation.  I have discussed case with general surgeon Dr. Young who will 

assess patient at the bedside.  Unfortunately the OR would not be open until 

January 3 due to lack of staffing.  Transfer to a hospital with surgery 

capabilities recommended.  Unfortunately [facility], [facility], and 

[facility] are on diversion.  Dr. Young did come into the ED to assess 

patient.  Patient states his symptoms improved with ED reduction attempt/partial

reduction.  Given this improvement in symptoms and after her exam agrees that 

patient can be discharged with follow-up.  Strict instructions to return (or go 

to the hospital with or capabilities) if symptoms worsen.  Patient will be 

treated for UTI.  Patient instructed to stop Imodium due to findings of 

constipation on his CT


Critical Care Time: Yes


Critical care time in (mins) excluding proc time.: 60


Critical care attestation.: 


If time is entered above; I have spent that time in minutes in the direct care 

of this critically ill patient, excluding procedure time.





Critical Care Time: 





60 Minutes of critical care time excluding procedures were used in the care of 

the patient.  Multiple calls were needed and attempt to coordinate transfer and 

consultations





ED Disposition


Clinical Impression: 


 UTI (urinary tract infection), Constipation





Inguinal hernia


Qualifiers:


 Recurrence: recurrent 





Disposition: 01 HOME / SELF CARE / HOMELESS


Is pt being admited?: No


Does the pt Need Aspirin: No


Condition: Stable


Instructions:  Inguinal Hernia, Adult, Easy-to-Read, Urinary Tract Infection, 

Adult, Easy-to-Read, Constipation, Adult, Easy-to-Read


Additional Instructions: 


Take the medication as prescribed.  It is very important that you follow-up with

a surgeon to determine appropriate surgical management of your hernia.  Stop 

taking Imodium.  Your CAT scan shows significant constipation.  Please return if

your symptoms worsen as indicated by your discharge instructions


Prescriptions: 


Nitrofurantoin Mono/M-Cryst [Macrobid CAP] 100 mg PO Q12HR #14 capsule


Referrals: 


PRIMARY CARE,MD [Primary Care Provider] - 3-5 Days


ROMINA EVANS DO [Staff Physician] - 3-5 Days


Time of Disposition: 15:38

## 2021-12-31 NOTE — CAT SCAN REPORT
CT ABDOMEN AND PELVIS WITHOUT CONTRAST



HISTORY: left inguinal hernia s/p reduction attempt.



COMPARISON: 12/2/2021



TECHNIQUE: CT images of the abdomen and pelvis were obtained without administration of intravenous co
ntrast.  All CT scans at this location are performed using CT dose reduction for ALARA by means of au
tomated exposure control. 



FINDINGS:



Lungs/bones:  Mild lower lobe atelectasis within limits of a noncontrast exam the liver, spleen, adre
nal glands, pancreas



Abdomen/pelvis:  , Gallbladder and upper GI tract appear normal. Hiatal hernia is again seen and may 
be slightly increased in size. Left renal cyst is again noted. No hydronephrosis. No renal or uretera
l stone.



There is a left inguinal hernia containing bowel loops. Increased size and number of bowel loops exte
nding into this hernia is identified with small surrounding fluid and inflammation. This appears new 
since prior examination. Constipation seen within the colon. Marked distention of the rectum with fec
al material and possible impaction. Mild rectal wall thickening is seen.



IMPRESSION:

1. Left inguinal hernia containing bowel loops. Mild inflammation and bowel wall thickening within th
e bowel loops in the inguinal hernia appears new since prior examination. An early incarceration syed
ot be completely excluded.

2. Constipation with possible impaction. There is marked thickening of the rectal wall as well.



Signer Name: Alfonzo Ugarte MD 

Signed: 12/31/2021 1:09 PM

Workstation Name: [employer]-

## 2021-12-31 NOTE — CONSULTATION
History of Present Illness


Consult date: 12/31/21


Reason for consult: hernia





- History of present illness


History of present illness: 





81 year old male with a hx of chronic left inguinal hernia (>12 months) 

presented to ER because he said that it felt bigger and he was having pain. He 

was seen less than a month ago and discharged from ER when he presented with 

similar discomfort and was diagnosed with UTI in addition to the hernia. He was 

placed on antibiotics for the UTI and said his pain resolved. He was feeling 

'normal' until yesterday. He denies any n/v, and last passed flatus today, and 

BM yesterday.  Dr. Rodriguez the emergency room physician was able to partially 

reduce the hernia and the patient says that he no longer has any pain or 

discomfort.  Patient had a CT scan which showed consistent left inguinal hernia 

from 2 prior CT scans, however today scan showed a slight increase in bowel 

contents.  There was also seen to be some mild inflammatory changes.





Past History


Past Medical History: CAD, COPD, hypertension, stroke


Past Surgical History: CABG, thyroidectomy


Social history: other (has care giver)


Family history: other





Medications and Allergies


                                    Allergies











Allergy/AdvReac Type Severity Reaction Status Date / Time


 


codeine Allergy  Itching, Verified 11/15/21 15:05





   fainting  


 


Iodinated Contrast Media Allergy  Anaphylaxis Verified 11/15/21 15:05





[Iodinated Contrast- Oral     





and IV Dye]     


 


penicillin Allergy  Rash, Verified 11/15/21 15:05





   itching,  





   fainting  


 


aspirin AdvReac  history of Verified 11/15/21 15:05





   stomach  





   ulcer  


 


lisinopril AdvReac  Angioedema Verified 11/15/21 15:05











                                Home Medications











 Medication  Instructions  Recorded  Confirmed  Last Taken  Type


 


Amlodipine Besylate [Norvasc] 5 mg PO DAILY #30 tablet 06/30/19 12/02/21 11/11/21 Rx


 


Clopidogrel [Plavix] 75 mg PO QDAY #30 tablet 06/30/19 12/02/21 11/11/21 Rx


 


Levothyroxine Sodium [Synthroid] 175 mcg PO QDAY #30 tablet 06/30/19 12/02/21 11/11/21 Rx


 


Atorvastatin [Lipitor] 80 mg PO QAM 11/15/21 12/02/21 11/11/21 History


 


Colostrum, Bovine [Colostrum] 500 mg PO QDAY 11/15/21 12/02/21 11/11/21 History


 


Ezetimibe [Zetia] 10 mg PO QDAY 11/15/21 12/02/21 11/11/21 History


 


Fenofibrate [Tricor] 145 mg PO QDAY 11/15/21 12/02/21 11/11/21 History


 


Metoprolol [Lopressor TAB] 50 mg PO QDAY 11/15/21 12/02/21 11/11/21 History


 


Multivit-Min/FA/Lycopen/Lutein 1 each PO QDAY 11/15/21 12/02/21 11/11/21 History





[Centrum Silver Tablet]     


 


Omeprazole 40 mg PO BID 11/15/21 12/02/21 11/11/21 History


 


hydrALAZINE [Apresoline TAB] 25 mg PO QDAY 11/15/21 12/02/21 11/11/21 History


 


Apixaban [Eliquis] 0 mg PO .AS DIR #88 tablet 11/17/21 12/02/21 Unknown Rx


 


Meclizine [Antivert] 12.5 mg PO Q8H PRN #30 tablet 11/17/21 12/02/21 Unknown Rx


 


Ciprofloxacin HCl 500 mg PO BID #20 tablet 12/02/21  Unknown Rx


 


Phenazopyridine [Pyridium] 200 mg PO BID #6 tab 12/02/21  Unknown Rx











Active Meds: 


Active Medications





Levofloxacin/Dextrose (Levaquin 750mg/150ml)  750 mg in 150 mls @ 100 mls/hr IV 

ONCE ONE; Protocol


   Stop: 12/31/21 15:54











Review of Systems


All systems: negative





- Gastrointestinal


abdominal pain, constipation, no nausea, no vomiting





Exam


                                   Vital Signs











Temp Pulse Resp BP Pulse Ox


 


 98.2 F   76   16   145/97   100 


 


 12/31/21 08:43  12/31/21 08:43  12/31/21 08:43  12/31/21 08:43  12/31/21 08:43














- General physical appearance


Positive: well developed, no distress, no pain





- Respiratory


Positive: normal expansion, normal respiratory effort





- Cardiovascular


Heart Sounds: Present: S1 & S2





- Extremities


Extremities: no ischemia





- Abdomen


Abdomen: Present: soft.  Absent: tender, distended, guarding


Hernia: inguinal, other (hernia is soft, non tender and partially reducible with

 no skin changes)





Results





- Labs





                                 12/31/21 09:26





                                 12/31/21 09:26


                              Abnormal lab results











  12/31/21 12/31/21 Range/Units





  09:26 10:07 


 


RDW  15.5 H   (13.2-15.2)  %


 


Mono % (Auto)  10.7 H   (0.0-7.3)  %


 


Mono # (Auto)  1.0 H   (0.0-0.8)  K/mm3


 


Urine WBC (Auto)   23.0 H  (0.0-6.0)  /HPF








                                 Diabetes panel











  12/31/21 Range/Units





  09:26 


 


Sodium  139  (137-145)  mmol/L


 


Potassium  4.9  (3.6-5.0)  mmol/L


 


Chloride  102.4  ()  mmol/L


 


Carbon Dioxide  23  (22-30)  mmol/L


 


BUN  17  (9-20)  mg/dL


 


Creatinine  0.8  (0.8-1.3)  mg/dL


 


Glucose  87  ()  mg/dL


 


Calcium  9.2  (8.4-10.2)  mg/dL








                                  Calcium panel











  12/31/21 Range/Units





  09:26 


 


Calcium  9.2  (8.4-10.2)  mg/dL








                                 Pituitary panel











  12/31/21 Range/Units





  09:26 


 


Sodium  139  (137-145)  mmol/L


 


Potassium  4.9  (3.6-5.0)  mmol/L


 


Chloride  102.4  ()  mmol/L


 


Carbon Dioxide  23  (22-30)  mmol/L


 


BUN  17  (9-20)  mg/dL


 


Creatinine  0.8  (0.8-1.3)  mg/dL


 


Glucose  87  ()  mg/dL


 


Calcium  9.2  (8.4-10.2)  mg/dL








                                  Adrenal panel











  12/31/21 Range/Units





  09:26 


 


Sodium  139  (137-145)  mmol/L


 


Potassium  4.9  (3.6-5.0)  mmol/L


 


Chloride  102.4  ()  mmol/L


 


Carbon Dioxide  23  (22-30)  mmol/L


 


BUN  17  (9-20)  mg/dL


 


Creatinine  0.8  (0.8-1.3)  mg/dL


 


Glucose  87  ()  mg/dL


 


Calcium  9.2  (8.4-10.2)  mg/dL














- Imaging


CT scan - abdomen: report reviewed, image reviewed


CT scan - pelvis: report reviewed, image reviewed





Assessment and Plan





81-year-old male with chronic left inguinal hernia, which is partially reducible

 however appears to be back to its baseline status per the patient.  Clinically 

hernia is not obstructing with no gross clinical signs of ischemia at this 

moment.  Patient is afebrile and stable with no leukocytosis.





Due to the Covid 19 pandemic at this time in addition to staffing shortages t

here is no operating room capabilities for the next 72 hours.  At this moment 

patient is not in need of emergent surgical intervention.  Had a long discussion

 with the patient that if he is able to tolerate diet and he is at his baseline 

of functioning he does not need to be admitted.  Patient was also encouraged to 

avoid constipation to decrease stress on the hernia with straining.  Patient 

says that he takes laxatives as needed.  He is strongly encouraged to follow-up 

with a surgeon in the near future to get elective repair of this hernia to 

hopefully avoid incarceration/strangulation.  Patient will also need cardiac 

clearance as he has had a CABG in the last year and is on anticoagulation.  Also

 discussed with patient should his pain or symptoms recur in addition to nausea 

vomiting patient may need to have emergent/urgent surgery.  Patient expressed 

understanding.

## 2022-01-31 ENCOUNTER — HOSPITAL ENCOUNTER (EMERGENCY)
Dept: HOSPITAL 5 - ED | Age: 82
LOS: 1 days | Discharge: HOME | End: 2022-02-01
Payer: MEDICARE

## 2022-01-31 DIAGNOSIS — K40.90: Primary | ICD-10-CM

## 2022-01-31 DIAGNOSIS — Z91.041: ICD-10-CM

## 2022-01-31 DIAGNOSIS — Z88.8: ICD-10-CM

## 2022-01-31 DIAGNOSIS — Z88.5: ICD-10-CM

## 2022-01-31 DIAGNOSIS — Z98.890: ICD-10-CM

## 2022-01-31 DIAGNOSIS — Z88.0: ICD-10-CM

## 2022-01-31 DIAGNOSIS — I10: ICD-10-CM

## 2022-01-31 DIAGNOSIS — J45.909: ICD-10-CM

## 2022-01-31 DIAGNOSIS — I25.10: ICD-10-CM

## 2022-01-31 DIAGNOSIS — E11.9: ICD-10-CM

## 2022-01-31 DIAGNOSIS — Z86.73: ICD-10-CM

## 2022-01-31 DIAGNOSIS — E05.00: ICD-10-CM

## 2022-01-31 LAB
BASOPHILS # (AUTO): 0.1 K/MM3 (ref 0–0.1)
BASOPHILS NFR BLD AUTO: 0.5 % (ref 0–1.8)
BUN SERPL-MCNC: 17 MG/DL (ref 9–20)
BUN/CREAT SERPL: 17 %
CALCIUM SERPL-MCNC: 9.2 MG/DL (ref 8.4–10.2)
EOSINOPHIL # BLD AUTO: 0.1 K/MM3 (ref 0–0.4)
EOSINOPHIL NFR BLD AUTO: 0.5 % (ref 0–4.3)
HCT VFR BLD CALC: 33.2 % (ref 35.5–45.6)
HEMOLYSIS INDEX: 3
HGB BLD-MCNC: 10.9 GM/DL (ref 11.8–15.2)
LYMPHOCYTES # BLD AUTO: 2.7 K/MM3 (ref 1.2–5.4)
LYMPHOCYTES NFR BLD AUTO: 24.9 % (ref 13.4–35)
MCHC RBC AUTO-ENTMCNC: 33 % (ref 32–34)
MCV RBC AUTO: 84 FL (ref 84–94)
MONOCYTES # (AUTO): 0.9 K/MM3 (ref 0–0.8)
MONOCYTES % (AUTO): 8.2 % (ref 0–7.3)
PLATELET # BLD: 545 K/MM3 (ref 140–440)
RBC # BLD AUTO: 3.96 M/MM3 (ref 3.65–5.03)

## 2022-01-31 PROCEDURE — 99283 EMERGENCY DEPT VISIT LOW MDM: CPT

## 2022-01-31 PROCEDURE — 80048 BASIC METABOLIC PNL TOTAL CA: CPT

## 2022-01-31 PROCEDURE — 36415 COLL VENOUS BLD VENIPUNCTURE: CPT

## 2022-01-31 PROCEDURE — 85025 COMPLETE CBC W/AUTO DIFF WBC: CPT

## 2022-01-31 PROCEDURE — 96374 THER/PROPH/DIAG INJ IV PUSH: CPT

## 2022-01-31 NOTE — EMERGENCY DEPARTMENT REPORT
ED Abdominal Pain HPI





- General


Stated Complaint: hernia


Time Seen by Provider: 01/31/22 18:23





- History of Present Illness


Initial Comments: 





Patient presents by EMS secondary to a hernia. He has had a chronic inguinal 

hernia for about a year. He has been seen multiple times because of the hernia. 

On his last visit, it was partially reduced and returned to baseline. He was not

having any symptoms. He was told that he should follow-up for elective juan

iorrhaphy. [facility] by [facility] that 

they would plan on getting it fixed, but that has not happened. Today, the 

hernia popped out more. It was more painful than usual. He has had no nausea or 

vomiting. There is no fevers or chills. There is no history of travel or trauma.

The pain is constant and aching. It is worse with palpation of the hernia 

itself.





- Related Data


                                Home Medications











 Medication  Instructions  Recorded  Confirmed  Last Taken


 


Atorvastatin [Lipitor] 80 mg PO QAM 11/15/21 12/02/21 11/11/21


 


Colostrum, Bovine [Colostrum] 500 mg PO QDAY 11/15/21 12/02/21 11/11/21


 


Ezetimibe [Zetia] 10 mg PO QDAY 11/15/21 12/02/21 11/11/21


 


Fenofibrate [Tricor] 145 mg PO QDAY 11/15/21 12/02/21 11/11/21


 


Metoprolol [Lopressor TAB] 50 mg PO QDAY 11/15/21 12/02/21 11/11/21


 


Multivit-Min/FA/Lycopen/Lutein 1 each PO QDAY 11/15/21 12/02/21 11/11/21





[Centrum Silver Tablet]    


 


Omeprazole 40 mg PO BID 11/15/21 12/02/21 11/11/21


 


hydrALAZINE [Apresoline TAB] 25 mg PO QDAY 11/15/21 12/02/21 11/11/21








                                  Previous Rx's











 Medication  Instructions  Recorded  Last Taken  Type


 


Amlodipine Besylate [Norvasc] 5 mg PO DAILY #30 tablet 06/30/19 11/11/21 Rx


 


Clopidogrel [Plavix] 75 mg PO QDAY #30 tablet 06/30/19 11/11/21 Rx


 


Levothyroxine Sodium [Synthroid] 175 mcg PO QDAY #30 tablet 06/30/19 11/11/21 Rx


 


Apixaban [Eliquis] 0 mg PO .AS DIR #88 tablet 11/17/21 Unknown Rx


 


Meclizine [Antivert] 12.5 mg PO Q8H PRN #30 tablet 11/17/21 Unknown Rx


 


Ciprofloxacin HCl 500 mg PO BID #20 tablet 12/02/21 Unknown Rx


 


Phenazopyridine [Pyridium] 200 mg PO BID #6 tab 12/02/21 Unknown Rx


 


Nitrofurantoin Mono/M-Cryst 100 mg PO Q12HR #14 capsule 12/31/21 Unknown Rx





[Macrobid CAP]    











                                    Allergies











Allergy/AdvReac Type Severity Reaction Status Date / Time


 


codeine Allergy  Itching, Verified 11/15/21 15:05





   fainting  


 


Iodinated Contrast Media Allergy  Anaphylaxis Verified 11/15/21 15:05





[Iodinated Contrast- Oral     





and IV Dye]     


 


penicillin Allergy  Rash, Verified 11/15/21 15:05





   itching,  





   fainting  


 


aspirin AdvReac  history of Verified 11/15/21 15:05





   stomach  





   ulcer  


 


lisinopril AdvReac  Angioedema Verified 11/15/21 15:05














ED Review of Systems


ROS: 


Stated complaint: hernia


Other details as noted in HPI





Comment: All other systems reviewed and negative


Constitutional: denies: fever


Eyes: denies: eye pain


ENT: denies: throat pain


Respiratory: denies: cough


Cardiovascular: denies: chest pain


Endocrine: denies: unexplained weight loss


Gastrointestinal: as per HPI


Genitourinary: denies: dysuria


Musculoskeletal: denies: back pain


Skin: denies: rash


Neurological: denies: headache


Hematological/Lymphatic: denies: easy bruising





ED Past Medical Hx





- Past Medical History


Hx Hypertension: Yes


Hx CVA: Yes (L sided deficits)


Hx Diabetes: Yes


Hx Pulmonary Embolism: Yes (November 2021)


Hx Asthma: Yes


Hx HIV: No


Additional medical history: CAD, Graves dx.  gastric ulcers, Constipation





- Surgical History


Additional Surgical History: knee OR bilat.  thyroid OR.  robotic heart surgery 

10 days ago





- Family History


Family history: hypertension





- Social History


Smoking Status: Never Smoker


Substance Use Type: None





- Medications


Home Medications: 


                                Home Medications











 Medication  Instructions  Recorded  Confirmed  Last Taken  Type


 


Amlodipine Besylate [Norvasc] 5 mg PO DAILY #30 tablet 06/30/19 12/02/21 11/11/21 Rx


 


Clopidogrel [Plavix] 75 mg PO QDAY #30 tablet 06/30/19 12/02/21 11/11/21 Rx


 


Levothyroxine Sodium [Synthroid] 175 mcg PO QDAY #30 tablet 06/30/19 12/02/21 11/11/21 Rx


 


Atorvastatin [Lipitor] 80 mg PO QAM 11/15/21 12/02/21 11/11/21 History


 


Colostrum, Bovine [Colostrum] 500 mg PO QDAY 11/15/21 12/02/21 11/11/21 History


 


Ezetimibe [Zetia] 10 mg PO QDAY 11/15/21 12/02/21 11/11/21 History


 


Fenofibrate [Tricor] 145 mg PO QDAY 11/15/21 12/02/21 11/11/21 History


 


Metoprolol [Lopressor TAB] 50 mg PO QDAY 11/15/21 12/02/21 11/11/21 History


 


Multivit-Min/FA/Lycopen/Lutein 1 each PO QDAY 11/15/21 12/02/21 11/11/21 History





[Centrum Silver Tablet]     


 


Omeprazole 40 mg PO BID 11/15/21 12/02/21 11/11/21 History


 


hydrALAZINE [Apresoline TAB] 25 mg PO QDAY 11/15/21 12/02/21 11/11/21 History


 


Apixaban [Eliquis] 0 mg PO .AS DIR #88 tablet 11/17/21 12/02/21 Unknown Rx


 


Meclizine [Antivert] 12.5 mg PO Q8H PRN #30 tablet 11/17/21 12/02/21 Unknown Rx


 


Ciprofloxacin HCl 500 mg PO BID #20 tablet 12/02/21  Unknown Rx


 


Phenazopyridine [Pyridium] 200 mg PO BID #6 tab 12/02/21  Unknown Rx


 


Nitrofurantoin Mono/M-Cryst 100 mg PO Q12HR #14 capsule 12/31/21  Unknown Rx





[Macrobid CAP]     














ED Physical Exam





- General


Limitations: No Limitations, Language Barrier, Other (Pulse ox noted and normal 

by EMS)


General appearance: alert, in no apparent distress





- Head


Head exam: Present: atraumatic, normocephalic





- Eye


Eye exam: Present: normal appearance, EOMI.  Absent: scleral icterus





- ENT


ENT exam: Present: normal orophraynx, normal external ear exam





- Neck


Neck exam: Present: normal inspection.  Absent: meningismus





- Respiratory


Respiratory exam: Present: normal lung sounds bilaterally.  Absent: respiratory 

distress





- Cardiovascular


Cardiovascular Exam: Present: regular rate, normal rhythm





- GI/Abdominal


GI/Abdominal exam: Present: soft.  Absent: distended, tenderness





- Extremities Exam


Extremities exam: Present: normal capillary refill





- Back Exam


Back exam: Absent: CVA tenderness (R), CVA tenderness (L)





- Neurological Exam


Neurological exam: Present: alert, oriented X3, CN II-XII intact.  Absent: motor

sensory deficit





- Psychiatric


Psychiatric exam: Present: normal affect, normal mood





- Skin


Skin exam: Present: warm, dry





ED Course





- Reevaluation(s)


Reevaluation #2: 





01/31/22 18:28


EMS was met. IV and labs were ordered. Old records reviewed.


Reevaluation #3: 





01/31/22 20:01


Hernia was easily reduced.  We will await labs and then discharge the patient.  

I have had this discussion with him.  [facility] 

[facility].





- Procedure Description


Procedures done: Procedure note: Hernia reduction.  Indication: Left inguinal 

hernia.  Patient was placed in Trendelenburg position.  Ice pack of been placed 

on the left groin hernia.  This was left in place approximately 30 minutes.  

Patient was given fentanyl 50 mics IV.  Subsequent to that, slow gentle pressure

was applied.  The hernia was easily reduced without difficulty.  Patient 

tolerated procedure well.  There are no apparent complications.





ED Medical Decision Making





- Medical Decision Making





Patient presented with left inguinal hernia.  This is been chronic and recurrent

for the patient.  This was easily reduced today.  I am not concerned for 

strangulated hernia or incarcerated bowel.  Patient had no abdominal tenderness 

otherwise.  He does not have any systemic symptoms of infection.  Patient will 

be treated symptomatically and released.  [facility].  We have discussed measures to reduce the hernia at home 

or prevent the hernia from extruding.


Critical Care Time: No


Critical care attestation.: 


If time is entered above; I have spent that time in minutes in the direct care 

of this critically ill patient, excluding procedure time.








ED Disposition


Clinical Impression: 


 Left inguinal hernia





Disposition: 01 HOME / SELF CARE / HOMELESS


Is pt being admited?: No


Condition: Stable


Instructions:  Inguinal Hernia, Adult, Easy-to-Read


Additional Instructions: 


Hold pressure in the left groin if you are about to laugh, cough, or sneeze.  

Avoid constipation as much as possible.  Drink plenty of water.  Follow-up with 

the surgeon.  Return for problems.


Referrals: 


PRIMARY CARE,MD [Referring] - 3-5 Days

## 2022-02-01 VITALS — DIASTOLIC BLOOD PRESSURE: 64 MMHG | SYSTOLIC BLOOD PRESSURE: 109 MMHG

## 2022-02-23 ENCOUNTER — HOSPITAL ENCOUNTER (EMERGENCY)
Dept: HOSPITAL 5 - ED | Age: 82
LOS: 1 days | Discharge: HOME | End: 2022-02-24
Payer: MEDICARE

## 2022-02-23 DIAGNOSIS — Z88.8: ICD-10-CM

## 2022-02-23 DIAGNOSIS — E11.9: ICD-10-CM

## 2022-02-23 DIAGNOSIS — J45.909: ICD-10-CM

## 2022-02-23 DIAGNOSIS — Z88.0: ICD-10-CM

## 2022-02-23 DIAGNOSIS — K40.90: ICD-10-CM

## 2022-02-23 DIAGNOSIS — K56.41: Primary | ICD-10-CM

## 2022-02-23 DIAGNOSIS — I10: ICD-10-CM

## 2022-02-23 DIAGNOSIS — Z88.5: ICD-10-CM

## 2022-02-23 DIAGNOSIS — Z91.041: ICD-10-CM

## 2022-02-23 PROCEDURE — 96375 TX/PRO/DX INJ NEW DRUG ADDON: CPT

## 2022-02-23 PROCEDURE — 99283 EMERGENCY DEPT VISIT LOW MDM: CPT

## 2022-02-23 PROCEDURE — 96374 THER/PROPH/DIAG INJ IV PUSH: CPT

## 2022-02-23 NOTE — EMERGENCY DEPARTMENT REPORT
ED General Adult HPI





- General


Chief complaint: Abdominal Pain


Stated complaint: Inguinal hernia pain


PUI?: No


Time Seen by Provider: 22 23:18


Source: patient, EMS ( EMS documentation not available at time of chart 

dictation ), RN notes reviewed, old records reviewed


Mode of arrival: Stretcher


Limitations: No Limitations





- History of Present Illness


Initial comments: 





The patient was evaluated in the emergency department for symptoms described in 

the history of present illness.  He/she was evaluated in the context of the 

global COVID-19 pandemic, which necessitated consideration that the patient 

might be at risk for infection with the virus that causes COVID-19.  

Institutional protocols and algorithms that pertain to the evaluation of 

patients at risk for COVID-19 are in a state of rapid change based on 

information released by regulatory bodies including the CDC and federal and 

state organizations.  These policies and algorithms were followed during the 

patient's care in the emergency department.  Please note that these policies, 

procedures and recommendations changed on a rapid basis.








The patient is a pleasant 81-year-old gentleman, with a known history of 

inguinal hernia, which has required manual reduction in the past, presenting to 

the ER today with a primary complaint of painful left-sided inguinal hernia.





Patient reports that he is constipated but passing gas.  He denies dysuria, but 

reports it somewhat difficult to urinate.  This patient was seen in this 

department in 2021 for similar symptoms, and had manual reduction of a 

known left-sided inguinal hernia, and was advised by general surgeon Dr. Young 

to follow-up as an outpatient.  Patient states he has followed up at [facility], but 

reports that secondary to the current Covid pandemic, and his multiple chronic 

medical comorbidities, he has been having difficulty scheduling elective repair.





His symptoms were markedly improved in the emergency room, with administration 

of morphine, Zofran, manual reduction of left-sided inguinal hernia (patient 

provided verbal consent for this), and also manual fecal disimpaction (patient 

also requested/provided consent for this), in addition to a soapsuds enema.








-: Gradual, hour(s)


Location: abdomen (Left lower quadrant/inguinal region)


Radiation: non-radiation


Severity scale (0 -10): 8


Quality: aching


Consistency: now resolved


Improves with: other (Medication and manual reduction)


Worsens with: other (Straining and defecated)





- Related Data


                                Home Medications











 Medication  Instructions  Recorded  Confirmed  Last Taken


 


Atorvastatin [Lipitor] 80 mg PO QAM 11/15/21 12/02/21 11/11/21


 


Colostrum, Bovine [Colostrum] 500 mg PO QDAY 11/15/21 12/02/21 11/11/21


 


Ezetimibe [Zetia] 10 mg PO QDAY 11/15/21 12/02/21 11/11/21


 


Fenofibrate [Tricor] 145 mg PO QDAY 11/15/21 12/02/21 11/11/21


 


Metoprolol [Lopressor TAB] 50 mg PO QDAY 11/15/21 12/02/21 11/11/21


 


Multivit-Min/FA/Lycopen/Lutein 1 each PO QDAY 11/15/21 12/02/21 11/11/21





[Centrum Silver Tablet]    


 


Omeprazole 40 mg PO BID 11/15/21 12/02/21 11/11/21


 


hydrALAZINE [Apresoline TAB] 25 mg PO QDAY 11/15/21 12/02/21 11/11/21








                                  Previous Rx's











 Medication  Instructions  Recorded  Last Taken  Type


 


Amlodipine Besylate [Norvasc] 5 mg PO DAILY #30 tablet 19 Rx


 


Clopidogrel [Plavix] 75 mg PO QDAY #30 tablet 19 Rx


 


Levothyroxine Sodium [Synthroid] 175 mcg PO QDAY #30 tablet 19 Rx


 


Apixaban [Eliquis] 0 mg PO .AS DIR #88 tablet 21 Unknown Rx


 


Meclizine [Antivert] 12.5 mg PO Q8H PRN #30 tablet 21 Unknown Rx


 


Ciprofloxacin HCl 500 mg PO BID #20 tablet 21 Unknown Rx


 


Phenazopyridine [Pyridium] 200 mg PO BID #6 tab 21 Unknown Rx


 


Nitrofurantoin Mono/M-Cryst 100 mg PO Q12HR #14 capsule 21 Unknown Rx





[Macrobid CAP]    


 


polyethylene glycoL 3350 [Miralax 17 gm PO QDAY #30 packet 22 Unknown Rx





3350]    











                                    Allergies











Allergy/AdvReac Type Severity Reaction Status Date / Time


 


codeine Allergy  Itching, Verified 11/15/21 15:05





   fainting  


 


Iodinated Contrast Media Allergy  Anaphylaxis Verified 11/15/21 15:05





[Iodinated Contrast- Oral     





and IV Dye]     


 


penicillin Allergy  Rash, Verified 11/15/21 15:05





   itching,  





   fainting  


 


aspirin AdvReac  history of Verified 11/15/21 15:05





   stomach  





   ulcer  


 


lisinopril AdvReac  Angioedema Verified 11/15/21 15:05














ED Review of Systems


ROS: 


Stated complaint: LOWER ABD PAIN


Other details as noted in HPI





Constitutional: denies: fever


Eyes: denies: eye discharge


ENT: denies: epistaxis


Respiratory: denies: cough


Cardiovascular: denies: chest pain


Gastrointestinal: constipation, other (Left inguinal pain).  denies: nausea, 

vomiting, hematemesis, melena, hematochezia


Genitourinary: other (Difficulty urinating).  denies: dysuria, testicular pain


Musculoskeletal: denies: back pain


Neurological: weakness (Chronic weakness)


Hematological/Lymphatic: denies: easy bleeding





ED Past Medical Hx





- Past Medical History


Previous Medical History?: Yes


Hx Hypertension: Yes


Hx CVA: Yes (L sided deficits)


Hx Diabetes: Yes


Hx Pulmonary Embolism: Yes (2021)


Hx Asthma: Yes


Hx HIV: No


Additional medical history: CAD, Graves dx.  gastric ulcers, Constipation





- Surgical History


Past Surgical History?: Yes


Additional Surgical History: knee OR bilat.  thyroid OR.  robotic heart surgery 

10 days ago





- Social History


Smoking Status: Never Smoker


Substance Use Type: None





- Medications


Home Medications: 


                                Home Medications











 Medication  Instructions  Recorded  Confirmed  Last Taken  Type


 


Amlodipine Besylate [Norvasc] 5 mg PO DAILY #30 tablet 19 Rx


 


Clopidogrel [Plavix] 75 mg PO QDAY #30 tablet 19 Rx


 


Levothyroxine Sodium [Synthroid] 175 mcg PO QDAY #30 tablet 19 Rx


 


Atorvastatin [Lipitor] 80 mg PO QAM 11/15/21 12/02/21 11/11/21 History


 


Colostrum, Bovine [Colostrum] 500 mg PO QDAY 11/15/21 12/02/21 11/11/21 History


 


Ezetimibe [Zetia] 10 mg PO QDAY 11/15/21 12/02/21 11/11/21 History


 


Fenofibrate [Tricor] 145 mg PO QDAY 11/15/21 12/02/21 11/11/21 History


 


Metoprolol [Lopressor TAB] 50 mg PO QDAY 11/15/21 12/02/21 11/11/21 History


 


Multivit-Min/FA/Lycopen/Lutein 1 each PO QDAY 11/15/21 12/02/21 11/11/21 History





[Centrum Silver Tablet]     


 


Omeprazole 40 mg PO BID 11/15/21 12/02/21 11/11/21 History


 


hydrALAZINE [Apresoline TAB] 25 mg PO QDAY 11/15/21 12/02/21 11/11/21 History


 


Apixaban [Eliquis] 0 mg PO .AS DIR #88 tablet 21 Unknown Rx


 


Meclizine [Antivert] 12.5 mg PO Q8H PRN #30 tablet 21 Unknown Rx


 


Ciprofloxacin HCl 500 mg PO BID #20 tablet 21  Unknown Rx


 


Phenazopyridine [Pyridium] 200 mg PO BID #6 tab 21  Unknown Rx


 


Nitrofurantoin Mono/M-Cryst 100 mg PO Q12HR #14 capsule 21  Unknown Rx





[Macrobid CAP]     


 


polyethylene glycoL 3350 [Miralax 17 gm PO QDAY #30 packet 22  Unknown Rx





3350]     














ED Physical Exam





- General


Limitations: No Limitations


General appearance: alert, in no apparent distress





- Head


Head exam: Present: atraumatic, normocephalic





- Eye


Eye exam: Present: normal appearance, EOMI.  Absent: nystagmus





- ENT


ENT exam: Present: normal exam, normal orophraynx, mucous membranes moist, 

normal external ear exam





- Neck


Neck exam: Present: normal inspection, full ROM.  Absent: tenderness, 

meningismus





- Respiratory


Respiratory exam: Present: normal lung sounds bilaterally.  Absent: respiratory 

distress, wheezes, rales, rhonchi, stridor, decreased breath sounds





- Cardiovascular


Cardiovascular Exam: Present: regular rate, normal rhythm, normal heart sounds. 

Absent: bradycardia, tachycardia, irregular rhythm, systolic murmur, diastolic 

murmur, rubs, gallop





- GI/Abdominal


GI/Abdominal exam: Present: soft, hernia (There is a tender but reducible left-

sided inguinal hernia).  Absent: distended, tenderness, guarding, rebound, 

rigid, pulsatile mass





- Rectal


Rectal exam: Present: normal inspection, normal rectal tone, fecal impaction, 

other (Chaperoned by nurse Kate Bradford).  Absent: black stool, bloody stool





- 


 exam: Present: other (Reducible left-sided inguinal hernia)





- Extremities Exam


Extremities exam: Present: normal inspection, full ROM, other (2+ pulses noted 

in the bilateral upper and lower extremities.  There is no palpable cord.   

negative Homans sign.  Muscular compartments are soft.  The pelvis is stable.). 

Absent: calf tenderness





- Back Exam


Back exam: Present: normal inspection, full ROM.  Absent: tenderness, CVA 

tenderness (R), CVA tenderness (L), paraspinal tenderness, vertebral tenderness





- Neurological Exam


Neurological exam: Present: alert, oriented X3, other (There is no facial droop.

 The tongue is midline.  5 out of 5 strength in 4 extremities.  Sensation intact

to light touch in 4 extremities)





- Psychiatric


Psychiatric exam: Present: anxious





- Skin


Skin exam: Present: warm, dry, intact, normal color.  Absent: rash





ED Course


                                   Vital Signs











  22





  01:18 01:20 01:26


 


Temperature 98.4 F  


 


Pulse Rate 90  


 


Respiratory 20 20 





Rate   


 


Blood Pressure 135/52  





[Left]   


 


O2 Sat by Pulse 100 100 





Oximetry   


 


O2 Sat by Pulse   99





Oximetry [   





Digit-Finger]   














- Reevaluation(s)


Reevaluation #1: 





22 01:22


Differential diagnosis, include but not limited to: Recurrent left-sided 

inguinal hernia, constipation, fecal impaction





Assessment and plan: 81-year-old gentleman, with a primary complaint of 

constipation and recurrent left-sided inguinal hernia.  The hernia is reduced by

myself with 1 attempt.  Please see procedure note.  Patient endorses immediate 

improvement in symptoms.  He also received a soapsuds enema, and fecal 

disimpaction by myself, patient again provided consent for this.





He endorses significant improvement in symptoms.  Abdomen is soft and benign, 

and the patient is passing gas and defecating.





Urine sample pending, patient drinking water at this time.





We will discharge with MiraLAX, diet lifestyle modifications, he may continue 

current outpatient medications, and follow-up with an outpatient primary care 

doctor or general surgeon.





Return precautions reviewed.  All questions answered.  Patient endorses 

understanding


22 01:48


Patient is able to urinate.  He denies dysuria and testicular pain.  He feels 

much improved.  He is ready for discharge.  Discussed natural history of 

constipation, diagnostic modifications, and need to follow-up.  Patient 

articulates understanding





- Procedure Description


Procedures done: Patient provided verbal consent for left-sided inguinal hernia 

reduction.  Patient placed on cardiac monitor, and receives 4 mg of morphine, 

and 4 mg of Zofran.  He is laid in the supine position, and with gentle direct 

pressure, applied by myself, to the left inguinal hernia, hernia is reduced, 

with 1 attempt, the patient endorses immediate relief of symptoms.  He tolerated

this procedure well, and without significant complication





- EJ/Peripheral Line


  ** Arm R


Time Out Performed: Yes


Indications: other (Placed IV to expedite therapy)


Skin Cleansed in Sterile Fashion: Yes


Size: 20


Dressing Placed: Tegaderm


Patient Tolerated Procedure: well





- Pulse Oximetry Interpretation


  ** Digit-Finger


Initial Pulse Oximetry Readin


O2 Sat by Pulse Oximetry: 99


Actions Taken: none





- Rectal Disimpaction


Consent Obtained: verbal consent, emergent situation


Indication: fecal impaction


Procedural Sedation: No


Sedation/Analgesia: opioids


Technique: manual disimpaction with (In conjunction with soapsuds enema)


Result: significant stool output


Complications: none


Patient Tolerated Procedure: well





ED Medical Decision Making





- Lab Data








                                   Vital Signs











  22





  01:18 01:20 01:25


 


Temperature 98.4 F  


 


Pulse Rate 90  


 


Respiratory 20 20 





Rate   


 


Blood Pressure 135/52  





[Left]   


 


O2 Sat by Pulse 100 100 





Oximetry   


 


O2 Sat by Pulse   99





Oximetry [   





Digit-Finger]   














- Medical Decision Making





Please continue current outpatient medications.





Please drink 4 to 5 cups of water per day, and consume plenty of fiber, 

vegetables, and lean protein.  Take the MiraLAX medication as directed.





Constipation symptoms typically take a few weeks to a few months to improve.  

Best treatment for constipation is diet and lifestyle modifications as directed.





Follow-up with a primary care doctor within the next month.  Follow-up with a 

general surgeon, such as Dr. Barton, for recurrent left-sided inguinal hernia 

within the next week.  Please avoid straining, pushing, extreme pressure with 

defecation and urination.





Please return to the emergency room right away with new pain, worsened pain, 

migration of pain, projectile vomiting, change in mental status, confusion, 

inability tolerate liquid feeds, new, worsened or different symptoms not present

on the initial emergency room evaluation


Critical care attestation.: 


If time is entered above; I have spent that time in minutes in the direct care 

of this critically ill patient, excluding procedure time.








ED Disposition


Clinical Impression: 


 Fecal impaction, Constipation, Inguinal hernia





Disposition:  HOME / SELF CARE / HOMELESS


Is pt being admited?: No


Does the pt Need Aspirin: No


Condition: Good


Instructions:  Hernia, Adult, Constipation, Adult


Prescriptions: 


polyethylene glycoL 3350 [Miralax 3350] 17 gm PO QDAY #30 packet


Referrals: 


LIBORIO ZIEGLER MD [Primary Care Provider] - 3-5 Days


PIETER BARTON MD [Staff Physician] - 3-5 Days

## 2022-02-24 VITALS — SYSTOLIC BLOOD PRESSURE: 135 MMHG | DIASTOLIC BLOOD PRESSURE: 52 MMHG
